# Patient Record
Sex: MALE | Race: WHITE | Employment: OTHER | URBAN - METROPOLITAN AREA
[De-identification: names, ages, dates, MRNs, and addresses within clinical notes are randomized per-mention and may not be internally consistent; named-entity substitution may affect disease eponyms.]

---

## 2018-06-01 ENCOUNTER — TRANSFERRED RECORDS (OUTPATIENT)
Dept: HEALTH INFORMATION MANAGEMENT | Facility: CLINIC | Age: 58
End: 2018-06-01

## 2018-07-10 ENCOUNTER — TRANSFERRED RECORDS (OUTPATIENT)
Dept: HEALTH INFORMATION MANAGEMENT | Facility: CLINIC | Age: 58
End: 2018-07-10

## 2019-01-23 LAB — PHQ9 SCORE: 10

## 2019-02-27 ENCOUNTER — TRANSFERRED RECORDS (OUTPATIENT)
Dept: HEALTH INFORMATION MANAGEMENT | Facility: CLINIC | Age: 59
End: 2019-02-27

## 2019-03-06 LAB — PHQ9 SCORE: 3

## 2019-03-22 ENCOUNTER — OFFICE VISIT (OUTPATIENT)
Dept: NEUROSURGERY | Facility: CLINIC | Age: 59
End: 2019-03-22
Payer: COMMERCIAL

## 2019-03-22 VITALS — WEIGHT: 201.2 LBS | DIASTOLIC BLOOD PRESSURE: 68 MMHG | TEMPERATURE: 98.2 F | SYSTOLIC BLOOD PRESSURE: 124 MMHG

## 2019-03-22 DIAGNOSIS — M48.02 CERVICAL STENOSIS OF SPINAL CANAL: Primary | ICD-10-CM

## 2019-03-22 PROCEDURE — 99204 OFFICE O/P NEW MOD 45 MIN: CPT | Performed by: PHYSICIAN ASSISTANT

## 2019-03-22 RX ORDER — MONTELUKAST SODIUM 10 MG/1
10 TABLET ORAL DAILY
COMMUNITY
Start: 2018-04-10

## 2019-03-22 RX ORDER — COVID-19 ANTIGEN TEST
KIT MISCELLANEOUS
Status: ON HOLD | COMMUNITY
End: 2019-05-15

## 2019-03-22 RX ORDER — FLUTICASONE PROPIONATE 50 MCG
1 SPRAY, SUSPENSION (ML) NASAL DAILY PRN
COMMUNITY
Start: 2018-04-10

## 2019-03-22 RX ORDER — SIMVASTATIN 40 MG
TABLET ORAL
Refills: 4 | Status: ON HOLD | COMMUNITY
Start: 2019-03-08 | End: 2019-08-28

## 2019-03-22 RX ORDER — DULOXETIN HYDROCHLORIDE 60 MG/1
60 CAPSULE, DELAYED RELEASE ORAL DAILY
Refills: 3 | COMMUNITY
Start: 2019-03-08

## 2019-03-22 RX ORDER — LOSARTAN POTASSIUM AND HYDROCHLOROTHIAZIDE 25; 100 MG/1; MG/1
1 TABLET ORAL DAILY
Refills: 3 | COMMUNITY
Start: 2019-03-08

## 2019-03-22 RX ORDER — ALBUTEROL SULFATE 90 UG/1
2 AEROSOL, METERED RESPIRATORY (INHALATION) EVERY 6 HOURS PRN
COMMUNITY
Start: 2018-04-10

## 2019-03-22 RX ORDER — AMLODIPINE BESYLATE 5 MG/1
5 TABLET ORAL DAILY
COMMUNITY
Start: 2018-04-10

## 2019-03-22 ASSESSMENT — PAIN SCALES - GENERAL: PAINLEVEL: SEVERE PAIN (7)

## 2019-03-22 NOTE — Clinical Note
3/22/2019         RE: Liborio Noguera  [address] [address] [place] [address]  [place] [address]        Dear Colleague,    Thank you for referring your patient, Liborio Noguera, to the [facility]. Please see a copy of my visit note below.    Dr. Reza Ya  [facility]  Neurosurgery Clinic Visit      CC: neck and back issues    Primary care Provider: [facility], Radiology    Referring Provider:  [facility]      Reason For Visit:   I was asked to consult on the patient for neck and back pain.      HPI: Liborio Noguera is a 58 year old male who presents for evaluation of his chief complaint of neck and back pain.  Regarding his low back, he describes gradually worsening low back pain, with pain that radiates into his legs bilaterally.  He has gotten to a point where he is unable to raise himself up by using his right leg.  He states that he was seen by an outside practice about a year ago, and it was recommended at that time that he have surgery.  Due to his work/life circumstances, he did not have time to get this done.  Regarding his cervical spine, he does note some chronic neck pain.  He also notes some bilateral hand numbness and tingling, right worse than left.  He denies any problems with fine motor control.  He does have some occasional pain into the arms bilaterally.  He has not had any recent treatment for this.  He did have a lumbar epidural injection in July 2018, which provided him with several days of symptomatic relief.  He denies bowel or bladder changes, or any problems with balance or coordination.      Past Medical History reviewed with patient during visit.    Past Surgical History reviewed with patient during visit.    Current Outpatient Medications   Medication     albuterol (VENTOLIN HFA) 108 (90 Base) MCG/ACT inhaler     amLODIPine (NORVASC) 5 MG tablet     fluticasone (FLONASE) 50 MCG/ACT nasal spray     fluticasone-salmeterol (ADVAIR  DISKUS) 250-50 MCG/DOSE inhaler     metFORMIN (GLUCOPHAGE) 500 MG tablet     montelukast (SINGULAIR) 10 MG tablet     sertraline (ZOLOFT) 50 MG tablet     DULoxetine (CYMBALTA) 60 MG capsule     losartan-hydrochlorothiazide (HYZAAR) 100-25 MG tablet     naproxen sodium 220 MG capsule     simvastatin (ZOCOR) 40 MG tablet     No current facility-administered medications for this visit.        Allergies   Allergen Reactions     Ace Inhibitors Cough       Social History     Socioeconomic History     Marital status:      Spouse name: None     Number of children: None     Years of education: None     Highest education level: None   Occupational History     None   Social Needs     Financial resource strain: None     Food insecurity:     Worry: None     Inability: None     Transportation needs:     Medical: None     Non-medical: None   Tobacco Use     Smoking status: Never Smoker     Smokeless tobacco: Never Used   Substance and Sexual Activity     Alcohol use: None     Drug use: None     Sexual activity: None   Lifestyle     Physical activity:     Days per week: None     Minutes per session: None     Stress: None   Relationships     Social connections:     Talks on phone: None     Gets together: None     Attends Religion service: None     Active member of club or organization: None     Attends meetings of clubs or organizations: None     Relationship status: None     Intimate partner violence:     Fear of current or ex partner: None     Emotionally abused: None     Physically abused: None     Forced sexual activity: None   Other Topics Concern     None   Social History Narrative     None       No family history on file.       ROS: 10 point ROS neg other than the symptoms noted above in the HPI.    Vital Signs: /68   Temp 98.2  F (36.8  C) (Temporal)   Wt 201 lb 3.2 oz (91.3 kg)     Examination:  Constitutional:  Alert, well nourished, NAD.  HEENT: Normocephalic, atraumatic.   Pulmonary:  Without shortness  of breath, normal effort.   Lymph: no lymphadenopathy to low back or LE.   Integumentary: Skin is free of rashes or lesions.   Cardiovascular:  No pitting edema of BLE.    Psych: Normal affect, no apparent distress    Neurological:  Awake  Alert  Oriented x 3  Speech clear  Cranial nerves II - XII grossly intact  Motor exam   Shoulder Abduction:  Right:  5/5   Left:  5/5  Biceps:                      Right:  5/5   Left:  5/5  Triceps:                     Right:  5/5   Left:  5/5  Wrist Extensors:       Right:  5/5   Left:  5/5  Wrist Flexors:           Right:  5/5   Left:  5/5  Intrinsics:                   Right:  5/5   Left:  5/5  Hip Flexor:                Right: 5/5  Left:  5/5  Hip Adductor:             Right:  5/5  Left:  5/5  Hip Abductor:             Right:  5/5  Left:  5/5  Gastroc Soleus:        Right:  5/5  Left:  5/5  Tib/Ant:                      Right:  5/5  Left:  5/5  EHL:                          Right:  5/5  Left:  5/5       Sensation normal to bilateral upper and lower extremities.    Reflexes are 2+ in the patellar and Achilles. There is no clonus. Downgoing Babinski.    Reflexes are 2+ in the brachial radialis and triceps. Negative Leah sign bilaterally.  Musculoskeletal:  Gait: Able to stand from a seated position.   Cervical examination reveals good range of motion.  No tenderness to palpation of the cervical spine or paraspinous muscles bilaterally.    Lumbar examination reveals no tenderness of the spine or paraspinous muscles.  Hip height is symmetrical. Negative SI joint, sciatic notch or greater trochanteric tenderness to palpation bilaterally.  Straight leg raise is negative bilaterally.      Imaging:   MRI of the cervical and lumbar spine was reviewed in the office today.  Cervical spine shows multiple levels of moderate stenosis, with multiple disc bulges and facet arthropathy.  Lumbar spine shows all severe central stenosis at L4-5, and a left paracentral disc herniation at  L5-S1.    Assessment/Plan:     Cervicalgia  Upper extremity radiculopathy  Low back pain  Lower extremity radiculopathy    Liborio Noguera is a 58 year old male.  I did have a discussion with the patient regarding his symptoms.  He has had years of worsening neck and back issues.  He understands the findings described in his MRI.  He is eager to resolve this problem, as he does have some time from work right now.  I will get him in for a consultation with physical therapy, and have him follow-up with Dr. Ya.  He is not interested in another injection, as it only provided him with a few days of relief.  He voiced agreement and understanding.          Nima Wheeler PA-C  [facility]  [address] [address]    Tel [phone]  Pager [phone]      Again, thank you for allowing me to participate in the care of your patient.        Sincerely,        Nima Wheeler PA-C

## 2019-03-22 NOTE — PROGRESS NOTES
Dr. Reza Ya  [facility]  Neurosurgery Clinic Visit      CC: neck and back issues    Primary care Provider: [facility], Radiology    Referring Provider:  [facility]      Reason For Visit:   I was asked to consult on the patient for neck and back pain.      HPI: Liborio Noguera is a 58 year old male who presents for evaluation of his chief complaint of neck and back pain.  Regarding his low back, he describes gradually worsening low back pain, with pain that radiates into his legs bilaterally.  He has gotten to a point where he is unable to raise himself up by using his right leg.  He states that he was seen by an outside practice about a year ago, and it was recommended at that time that he have surgery.  Due to his work/life circumstances, he did not have time to get this done.  Regarding his cervical spine, he does note some chronic neck pain.  He also notes some bilateral hand numbness and tingling, right worse than left.  He denies any problems with fine motor control.  He does have some occasional pain into the arms bilaterally.  He has not had any recent treatment for this.  He did have a lumbar epidural injection in July 2018, which provided him with several days of symptomatic relief.  He denies bowel or bladder changes, or any problems with balance or coordination.      Past Medical History reviewed with patient during visit.    Past Surgical History reviewed with patient during visit.    Current Outpatient Medications   Medication     albuterol (VENTOLIN HFA) 108 (90 Base) MCG/ACT inhaler     amLODIPine (NORVASC) 5 MG tablet     fluticasone (FLONASE) 50 MCG/ACT nasal spray     fluticasone-salmeterol (ADVAIR DISKUS) 250-50 MCG/DOSE inhaler     metFORMIN (GLUCOPHAGE) 500 MG tablet     montelukast (SINGULAIR) 10 MG tablet     sertraline (ZOLOFT) 50 MG tablet     DULoxetine (CYMBALTA) 60 MG capsule     losartan-hydrochlorothiazide (HYZAAR) 100-25 MG tablet      naproxen sodium 220 MG capsule     simvastatin (ZOCOR) 40 MG tablet     No current facility-administered medications for this visit.        Allergies   Allergen Reactions     Ace Inhibitors Cough       Social History     Socioeconomic History     Marital status:      Spouse name: None     Number of children: None     Years of education: None     Highest education level: None   Occupational History     None   Social Needs     Financial resource strain: None     Food insecurity:     Worry: None     Inability: None     Transportation needs:     Medical: None     Non-medical: None   Tobacco Use     Smoking status: Never Smoker     Smokeless tobacco: Never Used   Substance and Sexual Activity     Alcohol use: None     Drug use: None     Sexual activity: None   Lifestyle     Physical activity:     Days per week: None     Minutes per session: None     Stress: None   Relationships     Social connections:     Talks on phone: None     Gets together: None     Attends Baptism service: None     Active member of club or organization: None     Attends meetings of clubs or organizations: None     Relationship status: None     Intimate partner violence:     Fear of current or ex partner: None     Emotionally abused: None     Physically abused: None     Forced sexual activity: None   Other Topics Concern     None   Social History Narrative     None       No family history on file.       ROS: 10 point ROS neg other than the symptoms noted above in the HPI.    Vital Signs: /68   Temp 98.2  F (36.8  C) (Temporal)   Wt 201 lb 3.2 oz (91.3 kg)     Examination:  Constitutional:  Alert, well nourished, NAD.  HEENT: Normocephalic, atraumatic.   Pulmonary:  Without shortness of breath, normal effort.   Lymph: no lymphadenopathy to low back or LE.   Integumentary: Skin is free of rashes or lesions.   Cardiovascular:  No pitting edema of BLE.    Psych: Normal affect, no apparent  distress    Neurological:  Awake  Alert  Oriented x 3  Speech clear  Cranial nerves II - XII grossly intact  Motor exam   Shoulder Abduction:  Right:  5/5   Left:  5/5  Biceps:                      Right:  5/5   Left:  5/5  Triceps:                     Right:  5/5   Left:  5/5  Wrist Extensors:       Right:  5/5   Left:  5/5  Wrist Flexors:           Right:  5/5   Left:  5/5  Intrinsics:                   Right:  5/5   Left:  5/5  Hip Flexor:                Right: 5/5  Left:  5/5  Hip Adductor:             Right:  5/5  Left:  5/5  Hip Abductor:             Right:  5/5  Left:  5/5  Gastroc Soleus:        Right:  5/5  Left:  5/5  Tib/Ant:                      Right:  5/5  Left:  5/5  EHL:                          Right:  5/5  Left:  5/5       Sensation normal to bilateral upper and lower extremities.    Reflexes are 2+ in the patellar and Achilles. There is no clonus. Downgoing Babinski.    Reflexes are 2+ in the brachial radialis and triceps. Negative Leah sign bilaterally.  Musculoskeletal:  Gait: Able to stand from a seated position.   Cervical examination reveals good range of motion.  No tenderness to palpation of the cervical spine or paraspinous muscles bilaterally.    Lumbar examination reveals no tenderness of the spine or paraspinous muscles.  Hip height is symmetrical. Negative SI joint, sciatic notch or greater trochanteric tenderness to palpation bilaterally.  Straight leg raise is negative bilaterally.      Imaging:   MRI of the cervical and lumbar spine was reviewed in the office today.  Cervical spine shows multiple levels of moderate stenosis, with multiple disc bulges and facet arthropathy.  Lumbar spine shows all severe central stenosis at L4-5, and a left paracentral disc herniation at L5-S1.    Assessment/Plan:     Cervicalgia  Upper extremity radiculopathy  Low back pain  Lower extremity radiculopathy    Liborio Noguera is a 58 year old male.  I did have a discussion with the patient  regarding his symptoms.  He has had years of worsening neck and back issues.  He understands the findings described in his MRI.  He is eager to resolve this problem, as he does have some time from work right now.  I will get him in for a consultation with physical therapy, and have him follow-up with Dr. Ya.  He is not interested in another injection, as it only provided him with a few days of relief.  He voiced agreement and understanding.          Nima Wheeler PA-C  [facility]  [address] [address]    Tel [phone]  Pager [phone]

## 2019-04-02 ENCOUNTER — HOSPITAL ENCOUNTER (OUTPATIENT)
Dept: PHYSICAL THERAPY | Facility: OTHER | Age: 59
Setting detail: THERAPIES SERIES
End: 2019-04-02
Attending: PHYSICIAN ASSISTANT
Payer: COMMERCIAL

## 2019-04-02 DIAGNOSIS — M48.02 CERVICAL STENOSIS OF SPINAL CANAL: ICD-10-CM

## 2019-04-02 PROCEDURE — 97162 PT EVAL MOD COMPLEX 30 MIN: CPT | Mod: GP | Performed by: PHYSICAL THERAPIST

## 2019-04-02 PROCEDURE — 97012 MECHANICAL TRACTION THERAPY: CPT | Mod: GP | Performed by: PHYSICAL THERAPIST

## 2019-04-02 PROCEDURE — 97110 THERAPEUTIC EXERCISES: CPT | Mod: GP | Performed by: PHYSICAL THERAPIST

## 2019-04-02 PROCEDURE — 97010 HOT OR COLD PACKS THERAPY: CPT | Mod: GP | Performed by: PHYSICAL THERAPIST

## 2019-04-02 NOTE — PROGRESS NOTES
04/02/19 1332   General Information   Type of Visit Initial OP Ortho PT Evaluation   Start of Care Date 04/02/19   Referring Physician gage WALKER   Patient/Family Goals Statement low back and neck pain    Orders Evaluate and Treat   Date of Order 03/22/19   [facility]   Medical Diagnosis cervical stenosis of spinal canal M48.02   Surgical/Medical history reviewed Yes   Precautions/Limitations no known precautions/limitations   Body Part(s)   Body Part(s) Lumbar Spine/SI;Cervical Spine   Presentation and Etiology   Pertinent history of current problem (include personal factors and/or comorbidities that impact the POC) patient reports he has had B low back pain x 1 year right side worse and has pain and numb and tingling down laterial leg to foot and all toes are numb is present 50 % of the day . denies incottenence . has had neck pain x 1 year and more on right compared to left and will have tingling in right 3,4,5 finger 50% of the day and will have pain that comes from neck to elbow . and when he lays prone  or sidelying B UE will go numb. PMH none reported . work is [email or web address] for [employer] . PMH had injection to low back july 10 2018   Impairments A. Pain;K. Numbness;L. Tingling   Functional Limitations perform activities of daily living;perform required work activities;perform desired leisure / sports activities   Symptom Location neck and low back    Onset date of current episode/exacerbation 04/02/18   Chronicity Chronic   Pain rating (0-10 point scale) Best (/10);Worst (/10)   Best (/10) back 5/10 neck 5/10   Worst (/10) back 8/10 neck 7/10   Pain quality A. Sharp;B. Dull;C. Aching   Frequency of pain/symptoms A. Constant   Pain/symptoms are: Worse in the morning   Pain/symptoms exacerbated by B. Walking;G. Certain positions  (stairs )   Pain/symptoms eased by C. Rest;E. Changing positions;F. Certain positions   Progression of symptoms since onset: Worsened   Current / Previous  Interventions   Diagnostic Tests: X-ray;MRI   Prior Level of Function   Functional Level Prior Comment unable to do stairs stepping up with right LE    Current Level of Function   Current Community Support Family/friend caregiver   Patient role/employment history E. Unemployed   Fall Risk Screen   Fall screen completed by PT   Have you fallen 2 or more times in the past year? Yes   Have you fallen and had an injury in the past year? No   Timed Up and Go score (seconds) less than 13 seconds    Is patient a fall risk? No   Fall screen comments slipped on ice this winter and rough ground , right LE is not as sturdy    Lumbar Spine/SI Objective Findings   Flexion ROM full pain in low back    Extension ROM limited and more pain in low back    Right Side Bending ROM limited and more pain in low back    Left Side Bending ROM no change    Hip Screen neg   Hamstring Flexibility B 15   SLR pain in right low back    Patellar Tendon Reflexes  equal    Achilles Tendon Reflexes equal    Neurological Testing Comments tingling numb in right distal calf and toes    Cervical Spine   Cervical Flexion ROM 38   Cervical Extension ROM 44 pain in right shoulder    Cervical Right Side Bending ROM 38   Cervical Left Side Bending ROM 35   Cervical Right Rotation ROM 65   Cervical Left Rotation ROM 65   Cervical/Thoracic/Shoulder ROM Comments B UE WNL AROM and good strength    Spurling Test postive on right pain in shoulder no change in tingling    Dermatome/Sensory Testing right hand and fingers    Biceps Reflexes B equal    Brachioradialis Reflexes B equal    Triceps Reflexes B equal    Planned Therapy Interventions   Planned Therapy Interventions ADL retraining;balance training;ROM;strengthening;stretching   Planned Modality Interventions   Planned Modality Interventions Comments modalities as needed and trial of hot pack with traction for neck and low back    Clinical Impression   Criteria for Skilled Therapeutic Interventions Met yes,  treatment indicated   PT Diagnosis low back pain with weak and tingling in right LE and neck pain with right UE tingling    Functional limitations due to impairments cesar med SALOMÓN 33.33 NDI 12    Clinical Presentation Stable/Uncomplicated   Clinical Presentation Rationale patient seen due to c/o neck and low back pain with tingling in right UE and LE and weak inright LE , patient has had progressive increase in pain and tingling over the last 1 year, has been forced to quit work due to pain and weakness  Rx is exercises in clinic and instruction in HEP with trial of traction for neck and low back    Clinical Decision Making (Complexity) Low complexity   Predicted Duration of Therapy Intervention (days/wks) 2-3x week x 1 month , 1-2x week x 1 month    Risk & Benefits of therapy have been explained Yes   Patient, Family & other staff in agreement with plan of care Yes   Education Assessment   Preferred Learning Style Listening;Reading;Demonstration   Barriers to Learning No barriers   ORTHO GOALS   PT Ortho Eval Goals 1;2;3   Ortho Goal 1   Goal Identifier 1   Goal Description instruction in HEP and compliant with it 5 of 7 days    Target Date 06/02/19   Ortho Goal 2   Goal Identifier 2   Goal Description patient to have 50 % reduction in tingling in right UE and LE    Target Date 06/02/19   Ortho Goal 3   Goal Identifier 3   Goal Description patient to have reduction in pain level currently 5-8/10 goal is 3-6/10    Target Date 06/02/19   Total Evaluation Time   PT Hilary Moderate Complexity Minutes (98542) 30   Therapy Certification   Certification date from 04/02/19   Certification date to 06/02/19   Medical Diagnosis cervical stenosis of spinal canal M48.02, Cervicalgia,Upper extremity radiculopathy, , low back pain . lumbar radiculopathy

## 2019-04-02 NOTE — PROGRESS NOTES
[facility]          OUTPATIENT PHYSICAL THERAPY ORTHOPEDIC EVALUATION  PLAN OF TREATMENT FOR OUTPATIENT REHABILITATION  (COMPLETE FOR INITIAL CLAIMS ONLY)  Patient's Last Name, First Name, M.I.  YOB: 1960  Liborio Noguera    Provider s Name:  [facility]   Medical Record No.  [id number]   Start of Care Date:  04/02/19   Onset Date:  04/02/18   Type:     _X__PT   ___OT   ___SLP Medical Diagnosis:  cervical stenosis of spinal canal M48.02, Cervicalgia,Upper extremity radiculopathy, , low back pain . lumbar radiculopathy      PT Diagnosis:  low back pain with weak and tingling in right LE and neck pain with right UE tingling    Visits from SOC:  1      _________________________________________________________________________________  Plan of Treatment/Functional Goals:  ADL retraining, balance training, ROM, strengthening, stretching        modalities as needed and trial of hot pack with traction for neck and low back   Goals  Goal Identifier: 1  Goal Description: instruction in HEP and compliant with it 5 of 7 days   Target Date: 06/02/19    Goal Identifier: 2  Goal Description: patient to have 50 % reduction in tingling in right UE and LE   Target Date: 06/02/19    Goal Identifier: 3  Goal Description: patient to have reduction in pain level currently 5-8/10 goal is 3-6/10   Target Date: 06/02/19                                                           Therapy Frequency:     Predicted Duration of Therapy Intervention:  2-3x week x 1 month , 1-2x week x 1 month     Kenyatta Dobson, PT                 I CERTIFY THE NEED FOR THESE SERVICES FURNISHED UNDER        THIS PLAN OF TREATMENT AND WHILE UNDER MY CARE     (Physician co-signature of this document indicates review and certification of the therapy plan).                       Certification Date From:  04/02/19   Certification Date To:  06/02/19    Referring Provider:  gage Galdamez  Assessment        See Epic Evaluation Start of Care Date: 04/02/19

## 2019-04-03 ENCOUNTER — HOSPITAL ENCOUNTER (OUTPATIENT)
Dept: PHYSICAL THERAPY | Facility: OTHER | Age: 59
Setting detail: THERAPIES SERIES
End: 2019-04-03
Attending: PHYSICIAN ASSISTANT
Payer: COMMERCIAL

## 2019-04-03 ENCOUNTER — TRANSFERRED RECORDS (OUTPATIENT)
Dept: HEALTH INFORMATION MANAGEMENT | Facility: CLINIC | Age: 59
End: 2019-04-03

## 2019-04-03 LAB
CHOLEST SERPL-MCNC: 183 MG/DL
CREAT SERPL-MCNC: 1.18 MG/DL (ref 0.66–1.25)
GFR SERPL CREATININE-BSD FRML MDRD: >60 ML/MIN/1.73M2
GLUCOSE SERPL-MCNC: 122 MG/DL (ref 75–100)
HBA1C MFR BLD: 5.4 % (ref 4.8–6)
HDLC SERPL-MCNC: 107 MG/DL (ref 40–60)
LDLC SERPL CALC-MCNC: 65 MG/DL
NONHDLC SERPL-MCNC: 76 MG/DL
POTASSIUM SERPL-SCNC: 4 MMOL/L (ref 3.5–5.1)
TRIGL SERPL-MCNC: 54 MG/DL

## 2019-04-03 PROCEDURE — 97010 HOT OR COLD PACKS THERAPY: CPT | Mod: GP | Performed by: PHYSICAL THERAPIST

## 2019-04-03 PROCEDURE — 97012 MECHANICAL TRACTION THERAPY: CPT | Mod: GP | Performed by: PHYSICAL THERAPIST

## 2019-04-08 ENCOUNTER — HOSPITAL ENCOUNTER (OUTPATIENT)
Dept: PHYSICAL THERAPY | Facility: OTHER | Age: 59
Setting detail: THERAPIES SERIES
End: 2019-04-08
Attending: PHYSICIAN ASSISTANT
Payer: COMMERCIAL

## 2019-04-08 PROCEDURE — 97012 MECHANICAL TRACTION THERAPY: CPT | Mod: GP | Performed by: PHYSICAL THERAPIST

## 2019-04-08 PROCEDURE — 97010 HOT OR COLD PACKS THERAPY: CPT | Mod: GP | Performed by: PHYSICAL THERAPIST

## 2019-04-10 ENCOUNTER — HOSPITAL ENCOUNTER (OUTPATIENT)
Dept: PHYSICAL THERAPY | Facility: OTHER | Age: 59
Setting detail: THERAPIES SERIES
End: 2019-04-10
Attending: PHYSICIAN ASSISTANT
Payer: COMMERCIAL

## 2019-04-10 PROCEDURE — 97010 HOT OR COLD PACKS THERAPY: CPT | Mod: GP | Performed by: PHYSICAL THERAPIST

## 2019-04-10 PROCEDURE — 97012 MECHANICAL TRACTION THERAPY: CPT | Mod: GP | Performed by: PHYSICAL THERAPIST

## 2019-04-11 ENCOUNTER — TRANSFERRED RECORDS (OUTPATIENT)
Dept: HEALTH INFORMATION MANAGEMENT | Facility: CLINIC | Age: 59
End: 2019-04-11

## 2019-04-15 ENCOUNTER — HOSPITAL ENCOUNTER (OUTPATIENT)
Dept: PHYSICAL THERAPY | Facility: OTHER | Age: 59
Setting detail: THERAPIES SERIES
End: 2019-04-15
Attending: PHYSICIAN ASSISTANT
Payer: COMMERCIAL

## 2019-04-15 PROCEDURE — 97012 MECHANICAL TRACTION THERAPY: CPT | Mod: GP | Performed by: PHYSICAL THERAPIST

## 2019-04-15 PROCEDURE — 97010 HOT OR COLD PACKS THERAPY: CPT | Mod: GP | Performed by: PHYSICAL THERAPIST

## 2019-04-17 ENCOUNTER — HOSPITAL ENCOUNTER (OUTPATIENT)
Dept: PHYSICAL THERAPY | Facility: OTHER | Age: 59
Setting detail: THERAPIES SERIES
End: 2019-04-17
Attending: PHYSICIAN ASSISTANT
Payer: COMMERCIAL

## 2019-04-17 PROCEDURE — 97010 HOT OR COLD PACKS THERAPY: CPT | Mod: GP | Performed by: PHYSICAL THERAPIST

## 2019-04-17 PROCEDURE — 97012 MECHANICAL TRACTION THERAPY: CPT | Mod: GP | Performed by: PHYSICAL THERAPIST

## 2019-04-18 ENCOUNTER — OFFICE VISIT (OUTPATIENT)
Dept: NEUROSURGERY | Facility: CLINIC | Age: 59
End: 2019-04-18
Payer: COMMERCIAL

## 2019-04-18 VITALS — SYSTOLIC BLOOD PRESSURE: 115 MMHG | HEART RATE: 110 BPM | DIASTOLIC BLOOD PRESSURE: 73 MMHG | WEIGHT: 201 LBS

## 2019-04-18 DIAGNOSIS — M48.062 LUMBAR STENOSIS WITH NEUROGENIC CLAUDICATION: Primary | ICD-10-CM

## 2019-04-18 PROCEDURE — 99215 OFFICE O/P EST HI 40 MIN: CPT | Performed by: NEUROLOGICAL SURGERY

## 2019-04-18 ASSESSMENT — PAIN SCALES - GENERAL: PAINLEVEL: SEVERE PAIN (7)

## 2019-04-18 NOTE — NURSING NOTE
Liborio Noguera is a 58 year old male who presents for:  Chief Complaint   Patient presents with     Neurologic Problem     surgery consult neck and back pain         Initial Vitals:  /73 (BP Location: Left arm, Patient Position: Chair, Cuff Size: Adult Large)   Pulse 110   Wt 91.2 kg (201 lb)  There is no height or weight on file to calculate BMI.. There is no height or weight on file to calculate BSA. BP completed using cuff size: large  Severe Pain (7)        Nursing Comments:         Aida Fong

## 2019-04-18 NOTE — PATIENT INSTRUCTIONS
Surgery scheduled at [facility] for L4-5 lami/discectomy  (a same-day surgery with possible overnight stay)    Pre-Operative:  -Surgical risks: blood clots in the leg or lung, problems urinating, nerve damage, drainage from the incision, infection, stiffness  - Pre-operative physical with primary care physician within 30 days of surgical date.   -Stop all foods and liquids 8 hours prior to surgery.  -Shower procedure: please shower with antibacterial soap the night before surgery and morning of surgery. Refer to information sheet in folder.   - Discontinue Aspirin, NSAIDs (Advil, Ibuprofen, Naproxen, Nuprin, Diclofenac, Meloxicam, Aleve, Celebrex) x 7 days prior to surgical date. After surgery, do not begin taking these medications until given clearance.    - May take Tylenol for pain.    Post-Operative:  -Hospital stay: likely same day procedure with discharge home day of surgery, may stay for 23 hour observation hospitalization for monitoring.  - Post operative pain  will require pain medications and muscle relaxants. You will receive medication upon discharge.  -Do NOT drive while taking narcotic pain medication.  -Post operative incision care-    -Watch for signs of infection: redness, swelling, warmth, drainage, and fever of 101 degrees or higher. Notify clinic [phone].   -Keep incision clean and dry. You may shower. No submerging incision in water such as pools, hot tubs, baths for at least 8 weeks or until incision is healed.   - Post operative activity limitations for 4-6 weeks after surgery: no lifting > 10 pounds, limited bending, twisting, or overhead reaching. You will be re-evaluated at your follow up appointments.   -If you are currently employed, you will need to be off work for recovery and healing. Please fax any FMLA/short term disability paperwork to [phone]. You may call our clinic when you'd like to return to work and we can provide a work letter.   - Follow up  appointments: If you have staples, you will need to follow up 2 weeks after surgery with nurse. 6 week post op and 3 months post op. Please call to schedule follow up appointment at [phone].  -Surgery folder provided to patient.    Raisa DALEY RN ([facility] Nurse)  [facility]  [place] [address]  [place] [address]  T:  [phone]  F:  [phone]

## 2019-04-18 NOTE — LETTER
4/18/2019         RE: Liborio Noguera  [id number] [address] [place] [address]  [place] [address]        Dear Colleague,    Thank you for referring your patient, Liborio Noguera, to the [facility]. Please see a copy of my visit note below.       Patient Education    Education included but not limited to:  - Surgical risks: blood clots, urinating difficulties, nerve damage, infection.  - Pre-operative physical with primary care physician within 30 days of surgical date.   - Pre-operative clearance from other pertaining specialties.   - Discontinue NSAIDS x 7 days prior to surgical date.   - Do not begin taking NSAIDs (Advil, Motrin, Ibuprofen, Nuprin, Diclofenac, Meloxicam, Aleve, Celebrex, Aspirin, etc.) until 6 weeks after surgery if you had a fusion. May cause bleeding and interfere with bone healing.    -May try Tylenol for pain.  -Discussed being off work after surgery, short term disability, FMLA, etc.   -Forms to be completed    -Pre-op timeline: NPO, shower, medications    -Hospital stay: Checking in, surgery, recovery room, hospital room.    - Post operative pain management: narcotics, muscle relaxants, ice, etc.   -No driving while taking narcotics     -Post operative incision care:   Keep your incision clean and dry.   Okay to shower. No submerging in water until incision healed.   Watch for signs of infection and notify clinic if drainage or fever develops.   - Post operative activity limitations recommended until follow up appointment: no lifting > 10 pounds; limited bending, twisting, overhead reaching.  -If a brace is required per Dr. Ya, [facility] will fit you for the brace in the hospital.  - Follow up appointments: 6 week post op, 3 months post op. You will need to an xray before each appointment. Please call to schedule follow up appointment at [phone].   -  Education book was also given to the patient for further review.      Patient verbalized understanding of above instructions. All  questions were answered to the best of my ability and the patient's satisfaction. Patient advised to call with any additional questions or concerns.    Raisa Ray RN ([facility] Nurse)  [facility]  [address] [address]  T:  [phone]  F:  [phone]      Liborio Noguera is a 58 year old male who presents for evaluation of his chief complaint of neck and back pain.  Regarding his low back, he describes gradually worsening low back pain, with pain that radiates into his legs bilaterally.  He has gotten to a point where he is unable to raise himself up by using his right leg.  He states that he was seen by an outside practice about a year ago, and it was recommended at that time that he have surgery.  Due to his work/life circumstances, he did not have time to get this done.  Regarding his cervical spine, he does note some chronic neck pain.  He also notes some bilateral hand numbness and tingling, right worse than left.  He denies any problems with fine motor control.  He does have some occasional pain into the arms bilaterally.  He has not had any recent treatment for this.  He did have a lumbar epidural injection in July 2018, which provided him with several days of symptomatic relief.  He denies bowel or bladder changes, or any problems with balance or coordination.    In the last 2 months, having worst symptoms of back and right leg pain, with pain/paresthesias to right dorsal foot, and heaviness of the leg with inability to lift.  PT/ISMAEL without improvement.  MRI with L4-5 severe stenosis and right paracentral disc herniation.      Past Medical History reviewed with patient during visit.    Past Surgical History reviewed with patient during visit.    Current Outpatient Medications   Medication     albuterol (VENTOLIN HFA) 108 (90 Base) MCG/ACT inhaler     amLODIPine (NORVASC) 5 MG tablet     DULoxetine (CYMBALTA) 60 MG capsule      fluticasone (FLONASE) 50 MCG/ACT nasal spray     fluticasone-salmeterol (ADVAIR DISKUS) 250-50 MCG/DOSE inhaler     losartan-hydrochlorothiazide (HYZAAR) 100-25 MG tablet     metFORMIN (GLUCOPHAGE) 500 MG tablet     montelukast (SINGULAIR) 10 MG tablet     naproxen sodium 220 MG capsule     sertraline (ZOLOFT) 50 MG tablet     simvastatin (ZOCOR) 40 MG tablet     No current facility-administered medications for this visit.        Allergies   Allergen Reactions     Ace Inhibitors Cough       Social History     Socioeconomic History     Marital status:      Spouse name: None     Number of children: None     Years of education: None     Highest education level: None   Occupational History     None   Social Needs     Financial resource strain: None     Food insecurity:     Worry: None     Inability: None     Transportation needs:     Medical: None     Non-medical: None   Tobacco Use     Smoking status: Never Smoker     Smokeless tobacco: Never Used   Substance and Sexual Activity     Alcohol use: None     Drug use: None     Sexual activity: None   Lifestyle     Physical activity:     Days per week: None     Minutes per session: None     Stress: None   Relationships     Social connections:     Talks on phone: None     Gets together: None     Attends Buddhism service: None     Active member of club or organization: None     Attends meetings of clubs or organizations: None     Relationship status: None     Intimate partner violence:     Fear of current or ex partner: None     Emotionally abused: None     Physically abused: None     Forced sexual activity: None   Other Topics Concern     None   Social History Narrative     None       History reviewed. No pertinent family history.       ROS: 10 point ROS neg other than the symptoms noted above in the HPI.    Vital Signs: /73 (BP Location: Left arm, Patient Position: Chair, Cuff Size: Adult Large)   Pulse 110   Wt 91.2 kg (201 lb)      Examination:  Constitutional:  Alert, well nourished, NAD.  HEENT: Normocephalic, atraumatic.   Pulmonary:  Without shortness of breath, normal effort.   Lymph: no lymphadenopathy to low back or LE.   Integumentary: Skin is free of rashes or lesions.   Cardiovascular:  No pitting edema of BLE.    Psych: Normal affect, no apparent distress    Neurological:  Awake  Alert  Oriented x 3  Speech clear  Cranial nerves II - XII grossly intact  Motor exam   Shoulder Abduction:  Right:  5/5   Left:  5/5  Biceps:                      Right:  5/5   Left:  5/5  Triceps:                     Right:  5/5   Left:  5/5  Wrist Extensors:       Right:  5/5   Left:  5/5  Wrist Flexors:           Right:  5/5   Left:  5/5  Intrinsics:                   Right:  5/5   Left:  5/5  Hip Flexor:                Right: 5/5  Left:  5/5  Hip Adductor:             Right:  5/5  Left:  5/5  Hip Abductor:             Right:  5/5  Left:  5/5  Gastroc Soleus:        Right:  5/5  Left:  5/5  Tib/Ant:                      Right:  5/5  Left:  5/5  EHL:                          Right:  5/5  Left:  5/5       Sensation normal to bilateral upper and lower extremities.    Reflexes are 2+ in the patellar and Achilles. There is no clonus. Downgoing Babinski.    Reflexes are 2+ in the brachial radialis and triceps. Negative Leah sign bilaterally.  Musculoskeletal:  Gait: Able to stand from a seated position.   Cervical examination reveals good range of motion.  No tenderness to palpation of the cervical spine or paraspinous muscles bilaterally.    Lumbar examination reveals no tenderness of the spine or paraspinous muscles.  Hip height is symmetrical. Negative SI joint, sciatic notch or greater trochanteric tenderness to palpation bilaterally.  Straight leg raise is negative bilaterally.      Imaging:   MRI of the cervical and lumbar spine was reviewed in the office today.  Cervical spine shows multiple levels of moderate stenosis, with multiple disc  bulges and facet arthropathy.  Lumbar spine shows all severe central stenosis at L4-5, and a left paracentral disc herniation at L5-S1.    Assessment/Plan:     Cervicalgia  Upper extremity radiculopathy  Low back pain  Lower extremity radiculopathy    Will plan for L4-5 lami/discectomy  Risks and benefits discussed      Again, thank you for allowing me to participate in the care of your patient.        Sincerely,        Reza Ya MD

## 2019-04-18 NOTE — PROGRESS NOTES
Liborio Noguera is a 58 year old male who presents for evaluation of his chief complaint of neck and back pain.  Regarding his low back, he describes gradually worsening low back pain, with pain that radiates into his legs bilaterally.  He has gotten to a point where he is unable to raise himself up by using his right leg.  He states that he was seen by an outside practice about a year ago, and it was recommended at that time that he have surgery.  Due to his work/life circumstances, he did not have time to get this done.  Regarding his cervical spine, he does note some chronic neck pain.  He also notes some bilateral hand numbness and tingling, right worse than left.  He denies any problems with fine motor control.  He does have some occasional pain into the arms bilaterally.  He has not had any recent treatment for this.  He did have a lumbar epidural injection in July 2018, which provided him with several days of symptomatic relief.  He denies bowel or bladder changes, or any problems with balance or coordination.    In the last 2 months, having worst symptoms of back and right leg pain, with pain/paresthesias to right dorsal foot, and heaviness of the leg with inability to lift.  PT/ISMAEL without improvement.  MRI with L4-5 severe stenosis and right paracentral disc herniation.      Past Medical History reviewed with patient during visit.    Past Surgical History reviewed with patient during visit.    Current Outpatient Medications   Medication     albuterol (VENTOLIN HFA) 108 (90 Base) MCG/ACT inhaler     amLODIPine (NORVASC) 5 MG tablet     DULoxetine (CYMBALTA) 60 MG capsule     fluticasone (FLONASE) 50 MCG/ACT nasal spray     fluticasone-salmeterol (ADVAIR DISKUS) 250-50 MCG/DOSE inhaler     losartan-hydrochlorothiazide (HYZAAR) 100-25 MG tablet     metFORMIN (GLUCOPHAGE) 500 MG tablet     montelukast (SINGULAIR) 10 MG tablet     naproxen sodium 220 MG capsule     sertraline (ZOLOFT) 50 MG tablet      simvastatin (ZOCOR) 40 MG tablet     No current facility-administered medications for this visit.        Allergies   Allergen Reactions     Ace Inhibitors Cough       Social History     Socioeconomic History     Marital status:      Spouse name: None     Number of children: None     Years of education: None     Highest education level: None   Occupational History     None   Social Needs     Financial resource strain: None     Food insecurity:     Worry: None     Inability: None     Transportation needs:     Medical: None     Non-medical: None   Tobacco Use     Smoking status: Never Smoker     Smokeless tobacco: Never Used   Substance and Sexual Activity     Alcohol use: None     Drug use: None     Sexual activity: None   Lifestyle     Physical activity:     Days per week: None     Minutes per session: None     Stress: None   Relationships     Social connections:     Talks on phone: None     Gets together: None     Attends Scientology service: None     Active member of club or organization: None     Attends meetings of clubs or organizations: None     Relationship status: None     Intimate partner violence:     Fear of current or ex partner: None     Emotionally abused: None     Physically abused: None     Forced sexual activity: None   Other Topics Concern     None   Social History Narrative     None       History reviewed. No pertinent family history.       ROS: 10 point ROS neg other than the symptoms noted above in the HPI.    Vital Signs: /73 (BP Location: Left arm, Patient Position: Chair, Cuff Size: Adult Large)   Pulse 110   Wt 91.2 kg (201 lb)     Examination:  Constitutional:  Alert, well nourished, NAD.  HEENT: Normocephalic, atraumatic.   Pulmonary:  Without shortness of breath, normal effort.   Lymph: no lymphadenopathy to low back or LE.   Integumentary: Skin is free of rashes or lesions.   Cardiovascular:  No pitting edema of BLE.    Psych: Normal affect, no apparent  distress    Neurological:  Awake  Alert  Oriented x 3  Speech clear  Cranial nerves II - XII grossly intact  Motor exam   Shoulder Abduction:  Right:  5/5   Left:  5/5  Biceps:                      Right:  5/5   Left:  5/5  Triceps:                     Right:  5/5   Left:  5/5  Wrist Extensors:       Right:  5/5   Left:  5/5  Wrist Flexors:           Right:  5/5   Left:  5/5  Intrinsics:                   Right:  5/5   Left:  5/5  Hip Flexor:                Right: 5/5  Left:  5/5  Hip Adductor:             Right:  5/5  Left:  5/5  Hip Abductor:             Right:  5/5  Left:  5/5  Gastroc Soleus:        Right:  5/5  Left:  5/5  Tib/Ant:                      Right:  5/5  Left:  5/5  EHL:                          Right:  5/5  Left:  5/5       Sensation normal to bilateral upper and lower extremities.    Reflexes are 2+ in the patellar and Achilles. There is no clonus. Downgoing Babinski.    Reflexes are 2+ in the brachial radialis and triceps. Negative Leah sign bilaterally.  Musculoskeletal:  Gait: Able to stand from a seated position.   Cervical examination reveals good range of motion.  No tenderness to palpation of the cervical spine or paraspinous muscles bilaterally.    Lumbar examination reveals no tenderness of the spine or paraspinous muscles.  Hip height is symmetrical. Negative SI joint, sciatic notch or greater trochanteric tenderness to palpation bilaterally.  Straight leg raise is negative bilaterally.      Imaging:   MRI of the cervical and lumbar spine was reviewed in the office today.  Cervical spine shows multiple levels of moderate stenosis, with multiple disc bulges and facet arthropathy.  Lumbar spine shows all severe central stenosis at L4-5, and a left paracentral disc herniation at L5-S1.    Assessment/Plan:     Cervicalgia  Upper extremity radiculopathy  Low back pain  Lower extremity radiculopathy    Will plan for L4-5 lami/discectomy  Risks and benefits discussed

## 2019-04-18 NOTE — PROGRESS NOTES
Patient Education    Education included but not limited to:  - Surgical risks: blood clots, urinating difficulties, nerve damage, infection.  - Pre-operative physical with primary care physician within 30 days of surgical date.   - Pre-operative clearance from other pertaining specialties.   - Discontinue NSAIDS x 7 days prior to surgical date.   - Do not begin taking NSAIDs (Advil, Motrin, Ibuprofen, Nuprin, Diclofenac, Meloxicam, Aleve, Celebrex, Aspirin, etc.) until 6 weeks after surgery if you had a fusion. May cause bleeding and interfere with bone healing.    -May try Tylenol for pain.  -Discussed being off work after surgery, short term disability, FMLA, etc.   -Forms to be completed    -Pre-op timeline: NPO, shower, medications    -Hospital stay: Checking in, surgery, recovery room, hospital room.    - Post operative pain management: narcotics, muscle relaxants, ice, etc.   -No driving while taking narcotics     -Post operative incision care:   Keep your incision clean and dry.   Okay to shower. No submerging in water until incision healed.   Watch for signs of infection and notify clinic if drainage or fever develops.   - Post operative activity limitations recommended until follow up appointment: no lifting > 10 pounds; limited bending, twisting, overhead reaching.  -If a brace is required per Dr. Ya, [facility] will fit you for the brace in the hospital.  - Follow up appointments: 6 week post op, 3 months post op. You will need to an xray before each appointment. Please call to schedule follow up appointment at [phone].   -  Education book was also given to the patient for further review.      Patient verbalized understanding of above instructions. All questions were answered to the best of my ability and the patient's satisfaction. Patient advised to call with any additional questions or concerns.    Raisa Ray RN ([facility] Nurse)  [facility]  [facility]  [address] [address]  [place] [address]  T:  [phone]  F:  [phone]

## 2019-04-22 ENCOUNTER — HOSPITAL ENCOUNTER (OUTPATIENT)
Dept: PHYSICAL THERAPY | Facility: OTHER | Age: 59
Setting detail: THERAPIES SERIES
End: 2019-04-22
Attending: PHYSICIAN ASSISTANT
Payer: COMMERCIAL

## 2019-04-22 PROCEDURE — 97012 MECHANICAL TRACTION THERAPY: CPT | Mod: GP | Performed by: PHYSICAL THERAPIST

## 2019-04-22 PROCEDURE — 97010 HOT OR COLD PACKS THERAPY: CPT | Mod: GP | Performed by: PHYSICAL THERAPIST

## 2019-04-23 ENCOUNTER — TELEPHONE (OUTPATIENT)
Dept: NEUROSURGERY | Facility: CLINIC | Age: 59
End: 2019-04-23

## 2019-04-23 NOTE — TELEPHONE ENCOUNTER
Type of surgery: L4-5 Laminectomy/Discectomy  Location of surgery: [facility]  Date and time of surgery: 05/15/2019 at 10:30am  Surgeon: NICHELLE Ya  Pre-Op Appt Date: Discussed  Post-Op Appt Date: 06/28/2019  Packet sent out: Yes  Pre-cert/Authorization completed:  Yes  Date: 04/23/2019 KARL

## 2019-04-24 ENCOUNTER — HOSPITAL ENCOUNTER (OUTPATIENT)
Dept: PHYSICAL THERAPY | Facility: OTHER | Age: 59
Setting detail: THERAPIES SERIES
End: 2019-04-24
Attending: PHYSICIAN ASSISTANT
Payer: COMMERCIAL

## 2019-04-24 PROCEDURE — 97010 HOT OR COLD PACKS THERAPY: CPT | Mod: GP | Performed by: PHYSICAL THERAPIST

## 2019-04-24 PROCEDURE — 97012 MECHANICAL TRACTION THERAPY: CPT | Mod: GP | Performed by: PHYSICAL THERAPIST

## 2019-04-29 ENCOUNTER — HOSPITAL ENCOUNTER (OUTPATIENT)
Dept: PHYSICAL THERAPY | Facility: OTHER | Age: 59
Setting detail: THERAPIES SERIES
End: 2019-04-29
Attending: PHYSICIAN ASSISTANT
Payer: COMMERCIAL

## 2019-04-29 PROCEDURE — 97010 HOT OR COLD PACKS THERAPY: CPT | Mod: GP | Performed by: PHYSICAL THERAPIST

## 2019-04-29 PROCEDURE — 97012 MECHANICAL TRACTION THERAPY: CPT | Mod: GP | Performed by: PHYSICAL THERAPIST

## 2019-05-01 ENCOUNTER — HOSPITAL ENCOUNTER (OUTPATIENT)
Dept: PHYSICAL THERAPY | Facility: OTHER | Age: 59
Setting detail: THERAPIES SERIES
End: 2019-05-01
Attending: PHYSICIAN ASSISTANT
Payer: COMMERCIAL

## 2019-05-01 PROCEDURE — 97010 HOT OR COLD PACKS THERAPY: CPT | Mod: GP | Performed by: PHYSICAL THERAPIST

## 2019-05-01 PROCEDURE — 97012 MECHANICAL TRACTION THERAPY: CPT | Mod: GP | Performed by: PHYSICAL THERAPIST

## 2019-05-08 NOTE — ADDENDUM NOTE
Encounter addended by: Kenyatta Dobson, PT on: 5/8/2019 7:46 AM   Actions taken: Episode resolved, Sign clinical note

## 2019-05-08 NOTE — PROGRESS NOTES
Outpatient Physical Therapy Discharge Note     Patient: Liborio Noguera  : 1960    Beginning/End Dates of Reporting Period:  2019 to 2019    Referring Provider: gage gregory [facility]     Therapy Diagnosis: neck and back pain      Client Self Report:      Objective Measurements:  Objective Measure: pain low back 5-8/10 neck 5-7/10 cesar med SALOMÓN 33.33 NDI 12 at eval   Details: no change in cesar , SALOMÓN , NDI , pain LOW back 5-8/10 neck pain 5-6/10     Patient seen for 10 Rx sessions for use of traction to his low back and for his neck   For the low back we had no significant or long lasting relief of pain   For the neck he did have significant relief of pain with traction   He was instructed in HEP chin tucks 10x 4x day , supine or sitting knee to chest 10x 2-3 x day / or sit and floss hold 10 x 3 daily, will hold the low back exercises for now until told by his doctor to continue or do different therapy after surgery        Goals:  Goal Identifier 1   Goal Description instruction in HEP and compliant with it 5 of 7 days    Target Date 19   Date Met      Progress:     Goal Identifier 2   Goal Description patient to have 50 % reduction in tingling in right UE and LE    Target Date 19   Date Met      Progress:     Goal Identifier 3   Goal Description patient to have reduction in pain level currently 5-8/10 goal is 3-6/10    Target Date 19   Date Met      Progress:       Progress Toward Goals:   Progress this reporting period: patient was compliant with HEP and found relief for his neck but for the low back no significant improvement and he was going to have surgery           Plan:  Discharge from therapy.    Discharge:    Reason for Discharge: No further expectation of progress.    Equipment Issued: none    Discharge Plan: Patient to continue home program.

## 2019-05-13 ENCOUNTER — TRANSFERRED RECORDS (OUTPATIENT)
Dept: HEALTH INFORMATION MANAGEMENT | Facility: CLINIC | Age: 59
End: 2019-05-13

## 2019-05-14 ENCOUNTER — ANESTHESIA EVENT (OUTPATIENT)
Dept: SURGERY | Facility: CLINIC | Age: 59
End: 2019-05-14
Payer: COMMERCIAL

## 2019-05-14 ASSESSMENT — LIFESTYLE VARIABLES: TOBACCO_USE: 1

## 2019-05-15 ENCOUNTER — ANESTHESIA (OUTPATIENT)
Dept: SURGERY | Facility: CLINIC | Age: 59
End: 2019-05-15
Payer: COMMERCIAL

## 2019-05-15 ENCOUNTER — HOSPITAL ENCOUNTER (OUTPATIENT)
Facility: CLINIC | Age: 59
Discharge: HOME | End: 2019-05-15
Attending: NEUROLOGICAL SURGERY | Admitting: NEUROLOGICAL SURGERY
Payer: COMMERCIAL

## 2019-05-15 ENCOUNTER — HOSPITAL ENCOUNTER (OUTPATIENT)
Dept: GENERAL RADIOLOGY | Facility: CLINIC | Age: 59
End: 2019-05-15
Attending: NEUROLOGICAL SURGERY | Admitting: NEUROLOGICAL SURGERY
Payer: COMMERCIAL

## 2019-05-15 VITALS
BODY MASS INDEX: 32.3 KG/M2 | DIASTOLIC BLOOD PRESSURE: 74 MMHG | HEART RATE: 86 BPM | TEMPERATURE: 98.8 F | WEIGHT: 201 LBS | SYSTOLIC BLOOD PRESSURE: 106 MMHG | OXYGEN SATURATION: 95 % | HEIGHT: 66 IN | RESPIRATION RATE: 20 BRPM

## 2019-05-15 DIAGNOSIS — M48.061 LUMBAR STENOSIS: ICD-10-CM

## 2019-05-15 DIAGNOSIS — Z98.890 S/P LUMBAR LAMINECTOMY: Primary | ICD-10-CM

## 2019-05-15 LAB
GLUCOSE BLDC GLUCOMTR-MCNC: 113 MG/DL (ref 70–99)
GLUCOSE BLDC GLUCOMTR-MCNC: 128 MG/DL (ref 70–99)

## 2019-05-15 PROCEDURE — 25000128 H RX IP 250 OP 636: Performed by: NEUROLOGICAL SURGERY

## 2019-05-15 PROCEDURE — 25800030 ZZH RX IP 258 OP 636: Performed by: NURSE ANESTHETIST, CERTIFIED REGISTERED

## 2019-05-15 PROCEDURE — 40000277 XR SURGERY CARM FLUORO LESS THAN 5 MIN W STILLS: Mod: TC

## 2019-05-15 PROCEDURE — 82962 GLUCOSE BLOOD TEST: CPT

## 2019-05-15 PROCEDURE — C1894 INTRO/SHEATH, NON-LASER: HCPCS | Performed by: NEUROLOGICAL SURGERY

## 2019-05-15 PROCEDURE — 25000128 H RX IP 250 OP 636: Performed by: NURSE ANESTHETIST, CERTIFIED REGISTERED

## 2019-05-15 PROCEDURE — 36000065 ZZH SURGERY LEVEL 4 W FLUORO 1ST 30 MIN: Performed by: NEUROLOGICAL SURGERY

## 2019-05-15 PROCEDURE — 40000306 ZZH STATISTIC PRE PROC ASSESS II: Performed by: NEUROLOGICAL SURGERY

## 2019-05-15 PROCEDURE — 71000014 ZZH RECOVERY PHASE 1 LEVEL 2 FIRST HR: Performed by: NEUROLOGICAL SURGERY

## 2019-05-15 PROCEDURE — 27110028 ZZH OR GENERAL SUPPLY NON-STERILE: Performed by: NEUROLOGICAL SURGERY

## 2019-05-15 PROCEDURE — 25000566 ZZH SEVOFLURANE, EA 15 MIN: Performed by: NEUROLOGICAL SURGERY

## 2019-05-15 PROCEDURE — 25000125 ZZHC RX 250: Performed by: NURSE ANESTHETIST, CERTIFIED REGISTERED

## 2019-05-15 PROCEDURE — 36000063 ZZH SURGERY LEVEL 4 EA 15 ADDTL MIN: Performed by: NEUROLOGICAL SURGERY

## 2019-05-15 PROCEDURE — 37000009 ZZH ANESTHESIA TECHNICAL FEE, EACH ADDTL 15 MIN: Performed by: NEUROLOGICAL SURGERY

## 2019-05-15 PROCEDURE — 71000027 ZZH RECOVERY PHASE 2 EACH 15 MINS: Performed by: NEUROLOGICAL SURGERY

## 2019-05-15 PROCEDURE — 27210794 ZZH OR GENERAL SUPPLY STERILE: Performed by: NEUROLOGICAL SURGERY

## 2019-05-15 PROCEDURE — 63047 LAM FACETEC & FORAMOT LUMBAR: CPT | Mod: AS | Performed by: PHYSICIAN ASSISTANT

## 2019-05-15 PROCEDURE — 25000125 ZZHC RX 250: Performed by: NEUROLOGICAL SURGERY

## 2019-05-15 PROCEDURE — 63047 LAM FACETEC & FORAMOT LUMBAR: CPT | Performed by: NEUROLOGICAL SURGERY

## 2019-05-15 PROCEDURE — 37000008 ZZH ANESTHESIA TECHNICAL FEE, 1ST 30 MIN: Performed by: NEUROLOGICAL SURGERY

## 2019-05-15 PROCEDURE — 25000128 H RX IP 250 OP 636: Performed by: PHYSICIAN ASSISTANT

## 2019-05-15 RX ORDER — CEFAZOLIN SODIUM 2 G/100ML
2 INJECTION, SOLUTION INTRAVENOUS
Status: COMPLETED | OUTPATIENT
Start: 2019-05-15 | End: 2019-05-15

## 2019-05-15 RX ORDER — NALOXONE HYDROCHLORIDE 0.4 MG/ML
.1-.4 INJECTION, SOLUTION INTRAMUSCULAR; INTRAVENOUS; SUBCUTANEOUS
Status: DISCONTINUED | OUTPATIENT
Start: 2019-05-15 | End: 2019-05-15 | Stop reason: HOSPADM

## 2019-05-15 RX ORDER — DIMENHYDRINATE 50 MG/ML
25 INJECTION, SOLUTION INTRAMUSCULAR; INTRAVENOUS
Status: DISCONTINUED | OUTPATIENT
Start: 2019-05-15 | End: 2019-05-15 | Stop reason: HOSPADM

## 2019-05-15 RX ORDER — ONDANSETRON 4 MG/1
4 TABLET, ORALLY DISINTEGRATING ORAL EVERY 30 MIN PRN
Status: DISCONTINUED | OUTPATIENT
Start: 2019-05-15 | End: 2019-05-15 | Stop reason: HOSPADM

## 2019-05-15 RX ORDER — FENTANYL CITRATE 50 UG/ML
25-50 INJECTION, SOLUTION INTRAMUSCULAR; INTRAVENOUS
Status: DISCONTINUED | OUTPATIENT
Start: 2019-05-15 | End: 2019-05-15 | Stop reason: HOSPADM

## 2019-05-15 RX ORDER — HYDROMORPHONE HYDROCHLORIDE 1 MG/ML
.3-.5 INJECTION, SOLUTION INTRAMUSCULAR; INTRAVENOUS; SUBCUTANEOUS EVERY 10 MIN PRN
Status: DISCONTINUED | OUTPATIENT
Start: 2019-05-15 | End: 2019-05-15 | Stop reason: HOSPADM

## 2019-05-15 RX ORDER — METHOCARBAMOL 750 MG/1
750-1500 TABLET, FILM COATED ORAL
Status: DISCONTINUED | OUTPATIENT
Start: 2019-05-15 | End: 2019-05-15 | Stop reason: HOSPADM

## 2019-05-15 RX ORDER — AMOXICILLIN 250 MG
1-2 CAPSULE ORAL 2 TIMES DAILY
Qty: 30 TABLET | Refills: 0 | Status: ON HOLD | OUTPATIENT
Start: 2019-05-15 | End: 2019-08-28

## 2019-05-15 RX ORDER — LIDOCAINE HYDROCHLORIDE 20 MG/ML
INJECTION, SOLUTION INFILTRATION; PERINEURAL PRN
Status: DISCONTINUED | OUTPATIENT
Start: 2019-05-15 | End: 2019-05-15

## 2019-05-15 RX ORDER — ONDANSETRON 2 MG/ML
INJECTION INTRAMUSCULAR; INTRAVENOUS PRN
Status: DISCONTINUED | OUTPATIENT
Start: 2019-05-15 | End: 2019-05-15

## 2019-05-15 RX ORDER — ACETAMINOPHEN 500 MG
1000 TABLET ORAL 2 TIMES DAILY PRN
COMMUNITY

## 2019-05-15 RX ORDER — CEFAZOLIN SODIUM 1 G/3ML
1 INJECTION, POWDER, FOR SOLUTION INTRAMUSCULAR; INTRAVENOUS SEE ADMIN INSTRUCTIONS
Status: DISCONTINUED | OUTPATIENT
Start: 2019-05-15 | End: 2019-05-15 | Stop reason: HOSPADM

## 2019-05-15 RX ORDER — TIZANIDINE 2 MG/1
2-4 TABLET ORAL 3 TIMES DAILY
Qty: 60 TABLET | Refills: 1 | Status: SHIPPED | OUTPATIENT
Start: 2019-05-15 | End: 2019-06-24

## 2019-05-15 RX ORDER — SODIUM CHLORIDE, SODIUM LACTATE, POTASSIUM CHLORIDE, CALCIUM CHLORIDE 600; 310; 30; 20 MG/100ML; MG/100ML; MG/100ML; MG/100ML
INJECTION, SOLUTION INTRAVENOUS CONTINUOUS
Status: DISCONTINUED | OUTPATIENT
Start: 2019-05-15 | End: 2019-05-15 | Stop reason: HOSPADM

## 2019-05-15 RX ORDER — OXYCODONE HYDROCHLORIDE 5 MG/1
5-10 TABLET ORAL
Status: DISCONTINUED | OUTPATIENT
Start: 2019-05-15 | End: 2019-05-15 | Stop reason: HOSPADM

## 2019-05-15 RX ORDER — HYDROXYZINE HYDROCHLORIDE 25 MG/1
25 TABLET, FILM COATED ORAL
Status: DISCONTINUED | OUTPATIENT
Start: 2019-05-15 | End: 2019-05-15 | Stop reason: HOSPADM

## 2019-05-15 RX ORDER — ONDANSETRON 4 MG/1
4 TABLET, ORALLY DISINTEGRATING ORAL
Status: DISCONTINUED | OUTPATIENT
Start: 2019-05-15 | End: 2019-05-15 | Stop reason: HOSPADM

## 2019-05-15 RX ORDER — ONDANSETRON 2 MG/ML
4 INJECTION INTRAMUSCULAR; INTRAVENOUS EVERY 30 MIN PRN
Status: DISCONTINUED | OUTPATIENT
Start: 2019-05-15 | End: 2019-05-15 | Stop reason: HOSPADM

## 2019-05-15 RX ORDER — OXYCODONE HYDROCHLORIDE 5 MG/1
5-10 TABLET ORAL EVERY 4 HOURS PRN
Qty: 30 TABLET | Refills: 0 | Status: SHIPPED | OUTPATIENT
Start: 2019-05-15 | End: 2019-06-24

## 2019-05-15 RX ORDER — DEXAMETHASONE SODIUM PHOSPHATE 10 MG/ML
INJECTION, SOLUTION INTRAMUSCULAR; INTRAVENOUS PRN
Status: DISCONTINUED | OUTPATIENT
Start: 2019-05-15 | End: 2019-05-15

## 2019-05-15 RX ORDER — PROPOFOL 10 MG/ML
INJECTION, EMULSION INTRAVENOUS PRN
Status: DISCONTINUED | OUTPATIENT
Start: 2019-05-15 | End: 2019-05-15

## 2019-05-15 RX ORDER — LIDOCAINE HYDROCHLORIDE AND EPINEPHRINE 10; 10 MG/ML; UG/ML
INJECTION, SOLUTION INFILTRATION; PERINEURAL PRN
Status: DISCONTINUED | OUTPATIENT
Start: 2019-05-15 | End: 2019-05-15 | Stop reason: HOSPADM

## 2019-05-15 RX ORDER — FENTANYL CITRATE 50 UG/ML
INJECTION, SOLUTION INTRAMUSCULAR; INTRAVENOUS PRN
Status: DISCONTINUED | OUTPATIENT
Start: 2019-05-15 | End: 2019-05-15

## 2019-05-15 RX ORDER — MEPERIDINE HYDROCHLORIDE 25 MG/ML
12.5 INJECTION INTRAMUSCULAR; INTRAVENOUS; SUBCUTANEOUS
Status: DISCONTINUED | OUTPATIENT
Start: 2019-05-15 | End: 2019-05-15 | Stop reason: HOSPADM

## 2019-05-15 RX ADMIN — FENTANYL CITRATE 50 MCG: 50 INJECTION, SOLUTION INTRAMUSCULAR; INTRAVENOUS at 11:36

## 2019-05-15 RX ADMIN — SODIUM CHLORIDE, POTASSIUM CHLORIDE, SODIUM LACTATE AND CALCIUM CHLORIDE: 600; 310; 30; 20 INJECTION, SOLUTION INTRAVENOUS at 09:42

## 2019-05-15 RX ADMIN — LIDOCAINE HYDROCHLORIDE 1 ML: 10 INJECTION, SOLUTION EPIDURAL; INFILTRATION; INTRACAUDAL; PERINEURAL at 09:42

## 2019-05-15 RX ADMIN — HYDROMORPHONE HYDROCHLORIDE 0.5 MG: 1 INJECTION, SOLUTION INTRAMUSCULAR; INTRAVENOUS; SUBCUTANEOUS at 13:31

## 2019-05-15 RX ADMIN — SODIUM CHLORIDE, POTASSIUM CHLORIDE, SODIUM LACTATE AND CALCIUM CHLORIDE: 600; 310; 30; 20 INJECTION, SOLUTION INTRAVENOUS at 12:12

## 2019-05-15 RX ADMIN — LIDOCAINE HYDROCHLORIDE 100 MG: 20 INJECTION, SOLUTION INFILTRATION; PERINEURAL at 11:39

## 2019-05-15 RX ADMIN — CEFAZOLIN SODIUM 2 G: 2 INJECTION, SOLUTION INTRAVENOUS at 11:48

## 2019-05-15 RX ADMIN — DEXAMETHASONE SODIUM PHOSPHATE 8 MG: 10 INJECTION, SOLUTION INTRAMUSCULAR; INTRAVENOUS at 11:51

## 2019-05-15 RX ADMIN — ROCURONIUM BROMIDE 50 MG: 10 INJECTION INTRAVENOUS at 11:39

## 2019-05-15 RX ADMIN — PHENYLEPHRINE HYDROCHLORIDE 100 MCG: 10 INJECTION, SOLUTION INTRAMUSCULAR; INTRAVENOUS; SUBCUTANEOUS at 12:18

## 2019-05-15 RX ADMIN — MIDAZOLAM 1 MG: 1 INJECTION INTRAMUSCULAR; INTRAVENOUS at 11:36

## 2019-05-15 RX ADMIN — FENTANYL CITRATE 50 MCG: 50 INJECTION INTRAMUSCULAR; INTRAVENOUS at 13:33

## 2019-05-15 RX ADMIN — ROCURONIUM BROMIDE 10 MG: 10 INJECTION INTRAVENOUS at 12:33

## 2019-05-15 RX ADMIN — PROPOFOL 200 MG: 10 INJECTION, EMULSION INTRAVENOUS at 11:39

## 2019-05-15 RX ADMIN — FENTANYL CITRATE 50 MCG: 50 INJECTION, SOLUTION INTRAMUSCULAR; INTRAVENOUS at 11:39

## 2019-05-15 RX ADMIN — ONDANSETRON 4 MG: 2 INJECTION INTRAMUSCULAR; INTRAVENOUS at 12:54

## 2019-05-15 RX ADMIN — PHENYLEPHRINE HYDROCHLORIDE 100 MCG: 10 INJECTION, SOLUTION INTRAMUSCULAR; INTRAVENOUS; SUBCUTANEOUS at 12:26

## 2019-05-15 RX ADMIN — MIDAZOLAM 1 MG: 1 INJECTION INTRAMUSCULAR; INTRAVENOUS at 11:33

## 2019-05-15 RX ADMIN — PHENYLEPHRINE HYDROCHLORIDE 100 MCG: 10 INJECTION, SOLUTION INTRAMUSCULAR; INTRAVENOUS; SUBCUTANEOUS at 12:00

## 2019-05-15 RX ADMIN — PHENYLEPHRINE HYDROCHLORIDE 100 MCG: 10 INJECTION, SOLUTION INTRAMUSCULAR; INTRAVENOUS; SUBCUTANEOUS at 12:09

## 2019-05-15 RX ADMIN — SUGAMMADEX 200 MG: 100 INJECTION, SOLUTION INTRAVENOUS at 13:07

## 2019-05-15 ASSESSMENT — MIFFLIN-ST. JEOR: SCORE: 1669.48

## 2019-05-15 NOTE — ANESTHESIA PREPROCEDURE EVALUATION
Anesthesia Pre-Procedure Evaluation    Patient: Liborio Noguera   MRN: [id number] : 1960          Preoperative Diagnosis: lumbar stenosis    Procedure(s):  LUMBAR 4-5 LAMINECTOMY/DISCECTOMY LUMBAR    No past medical history on file.  History reviewed. No pertinent surgical history.    Anesthesia Evaluation     . Pt has had prior anesthetic. Type: General    No history of anesthetic complications          ROS/MED HX    ENT/Pulmonary:     (+)tobacco use, Past use , . .    Neurologic:  - neg neurologic ROS     Cardiovascular:     (+) Dyslipidemia, hypertension----. : . . . :. . No previous cardiac testing       METS/Exercise Tolerance:  4 - Raking leaves, gardening   Hematologic:  - neg hematologic  ROS       Musculoskeletal:   (+)  other musculoskeletal- chronic lumbar back pain      GI/Hepatic:  - neg GI/hepatic ROS       Renal/Genitourinary:  - ROS Renal section negative       Endo:     (+) type II DM Last HgA1c: 5.4 date: 4/3/19 Not using insulin - not using insulin pump Normal glucose range: low 100's .      Psychiatric:     (+) psychiatric history depression      Infectious Disease:  - neg infectious disease ROS       Malignancy:      - no malignancy   Other:    (+) H/O Chronic Pain,                        Physical Exam  Normal systems: cardiovascular and pulmonary    Airway   Mallampati: III  TM distance: >3 FB  Neck ROM: full    Dental   (+) caps    Cardiovascular   Rhythm and rate: regular and normal      Pulmonary    breath sounds clear to auscultation            No results found for: WBC, HGB, HCT, PLT, CRP, SED, NA, POTASSIUM, CHLORIDE, CO2, BUN, CR, GLC, DOLORES, PHOS, MAG, ALBUMIN, PROTTOTAL, ALT, AST, GGT, ALKPHOS, BILITOTAL, BILIDIRECT, LIPASE, AMYLASE, CHARLIE, PTT, INR, FIBR, TSH, T4, T3, HCG, HCGS, CKTOTAL, CKMB, TROPN    Preop Vitals  BP Readings from Last 3 Encounters:   19 115/73   19 124/68    Pulse Readings from Last 3 Encounters:   19 110      Resp Readings from Last 3  Encounters:   No data found for Resp    SpO2 Readings from Last 3 Encounters:   No data found for SpO2      Temp Readings from Last 1 Encounters:   03/22/19 98.2  F (36.8  C) (Temporal)    Ht Readings from Last 1 Encounters:   No data found for Ht      Wt Readings from Last 1 Encounters:   04/18/19 91.2 kg (201 lb)    There is no height or weight on file to calculate BMI.       Anesthesia Plan      History & Physical Review  History and physical reviewed and following examination; no interval change.    ASA Status:  3 .    NPO Status:  > 8 hours    Plan for General, RSI and ETT with Intravenous and Propofol induction. Maintenance will be Balanced.    PONV prophylaxis:  Ondansetron (or other 5HT-3) and Dexamethasone or Solumedrol       Postoperative Care  Postoperative pain management:  IV analgesics and Oral pain medications.      Consents  Anesthetic plan, risks, benefits and alternatives discussed with:  Patient.  Use of blood products discussed: No .   .                 JOEL Garcia CRNA

## 2019-05-15 NOTE — BRIEF OP NOTE
[facility]    Brief Operative Note    Pre-operative diagnosis: lumbar stenosis  Post-operative diagnosis same  Procedure: Procedure(s):  LUMBAR 4-5 LAMINECTOMY/DISCECTOMY LUMBAR  Surgeon: Surgeon(s) and Role:     * Reza Ya MD - Primary     * Nima Wheeler PA-C - Assisting  Anesthesia: General   Estimated blood loss: 50 mL  Drains: None  Specimens: * No specimens in log *  Findings:   None.  Complications: None.  Implants:  * No implants in log *       Nima Wheeler PA-C  [facility]

## 2019-05-15 NOTE — ANESTHESIA CARE TRANSFER NOTE
Patient: Liborio Noguera    Procedure(s):  LUMBAR 4-5 LAMINECTOMY/DISCECTOMY LUMBAR    Diagnosis: lumbar stenosis  Diagnosis Additional Information: No value filed.    Anesthesia Type:   General, RSI, ETT     Note:  Airway :Nasal Cannula  Patient transferred to:PACU  Handoff Report: Identifed the Patient, Identified the Reponsible Provider, Reviewed the pertinent medical history, Discussed the surgical course, Reviewed Intra-OP anesthesia mangement and issues during anesthesia, Set expectations for post-procedure period and Allowed opportunity for questions and acknowledgement of understanding      Vitals: (Last set prior to Anesthesia Care Transfer)    CRNA VITALS  5/15/2019 1245 - 5/15/2019 1326      5/15/2019             EKG:  Sinus rhythm                Electronically Signed By: JOEL Garcia CRNA  May 15, 2019  1:26 PM

## 2019-05-15 NOTE — OP NOTE
Date of surgery: 5/15/2019  Surgeon: Reza Ya MD  Assistant: STEVE Rosales  Note: Nima Wheeler was present for and assisted with the entire surgery, and his/her role as an assistant was crucial for aid in positioning, exposure, suctioning, retraction, and closure    Preoperative diagnosis: Lumbar stenosis  Postoperative diagnosis: Lumbar stenosis    Procedure:  1.  L4-5 bilateral laminectomy and medial facetectomy for decompression of central stenosis  3.  Use of intraoperative microscope and fluoroscopy    EBL: 25 mL    Indications: 58-year-old male who presented with intractable back and leg pain.  MRI showed severe stenosis at L4-5.  Underwent non-operative management with failure to improve.  Risks, benefits, indications, and alternatives were discussed with the patient in detail, and she wished to proceed.    Description of surgery: The patient was positioned prone.  Sterile prepping and draping procedures were performed.  Antibiotics were administered and timeout was performed.  A midline lumbar incision was performed.  The monopolar was used to expose the spinous processes, lamina, and medial facets at L4-5.  Fluoroscopy was used to verify the correct level.  The high speed drill was then used to perform bilateral laminectomies and medial facetectomies at L4-5 with preservation of the midline tension band.  The Kerrison rongeurs were then used to remove the Ligamentum flavum at L4-5, with decompression of the thecal sac and nerve roots.  Bilateral limited discectomy was performed with the pituitary rongeurs.  Antibiotic irrigation was performed and hemostasis achieved.  The fascia was closed with 0-Vicryl sutures, and the dermal layer was closed with 2-0 vicryl sutures.  There were no intraprocedural complications.

## 2019-05-15 NOTE — DISCHARGE INSTRUCTIONS
Spine Clinic at [facility]   Dr. Ya Discharge Instructions Following Spine Surgery   [phone] ([facility] Office)   Monday - Friday; 8:00 AM - 4:00 PM   In General:   After you have had surgery on your spine, remember do not twist, or excessively flex or extend the area that you had surgery. These activities can prevent healing. Pain is normal and to be expected following surgery. Please call our office to schedule your appointment follow up appointment.   Bowel Care:   Many people have constipation (hard stools) after surgery. To help prevent constipation: Drink plenty of fluid (8-10 glasses/day); Eat more fiber, such as whole grain bread, bran cereal, and fruits and vegetables; Stay active by walking; Over the counter stool softener may also help.   Medications:   Spine surgery and pain management is unique to all patients. You will generally be given medications for pain, muscle spasms or tightness, and for constipation during the immediate post op period. It is important that you use these as prescribed. Please remember to bring your pill bottles to all of your appointments. Avoid driving while taking narcotic pain medications. Avoid alcoholic beverages while taking narcotic pain medications. You can use ice to areas of pain as needed, 20 minutes at a time. Changing positions and walking will help loosen your muscles as well. No NSAIDs (Ibuprofen, Advil, Motrin, Aleve, Naproxen) for 14 days.   Driving:   No driving while on narcotic pain medications. It is state law not to drive while under the influence of a drug to a degree which renders you incapable of safely driving. The narcotic medication you will be taking after surgery falls under this category.   Activity:   After surgery, most people feel less pain than they have had in a long time. Walking and light activities will help you regain the use of your muscles. You are encouraged to walk: start with short walks 5-10 minutes at a time  for 4-5 times per day and increase as tolerated. Stair climbing as tolerated, we recommend you use the railing. No lifting greater than 10 pounds: approximately equal to one gallon of milk. No twisting, bending in the area you have had surgery. No housework, vacuuming, laundry, leaf raking, lawn mowing, or snow removal. Wear your brace (if ordered) as directed.   Showers:   If you have sutures or staples you may shower two days after surgery. It is ok to let water run over your incision but do not touch or scrub on the incision. Pat dry immediately after showering. If there is a dressing in place, you may remove it 2 days after surgery. If you were closed with Derma byrne (glue), you may shower without covering the incision. No baths, hot tubs, or pool activity for at least 6 weeks.   Nutrition:   In general, your diet restrictions will not change with your surgery. You may need to eat small frequent meals initially until your appetite returns. Eat plenty of high fiber foods and drink plenty of fluids. If you do not have a fluid restriction from or prior to surgery, we recommend 6-8 (8oz) glasses of water per day. Other fluids are fine, but water is best. Nausea is not uncommon; it is a common side effect to many pain medications. We recommend that you take the pain medications with food, if this does not improve your symptoms, please call us.   Smoking:   For proper healing it is required that you quit using all tobacco products. This includes smoking, chewing, nicotine gums, and nicotine patches.   Follow-Up Appointment   Neurosurgery Appointment with Teresa Leyva PA-C or Nima Wheeler PA-C at the clinic in 6 weeks. Call [phone] for appointment.  Your appointment is already scheduled.  Call Dr. Ya at [phone] if these occur: Drainage from your incision, increased pain/redness/swelling, temperatures greater than 101.5, increased leg pain or swelling or unrelieved headaches   Go to the nearest Emergency Room  if you experience: chest pain, shortness of breath, neck swelling or swallowing problems  [facility]   Adult Discharge Orders & Instructions     For 24 hours after surgery    1. Get plenty of rest.  A responsible adult must stay with you for at least 24 hours after you leave the hospital.   2. Do not drive or use heavy equipment.  If you have weakness or tingling, don't drive or use heavy equipment until this feeling goes away.  3. Do not drink alcohol.  4. Avoid strenuous or risky activities.  Ask for help when climbing stairs.   5. You may feel lightheaded.  If so, sit for a few minutes before standing.  Have someone help you get up.   6. You may have a slight fever. Call the doctor if your fever is over 100 F (37.7 C) (taken under the tongue) or lasts longer than 24 hours.  7. You may have a dry mouth, a sore throat, muscle aches or trouble sleeping.  These should go away after 24 hours.  8. Do not make important or legal decisions.  We don t expect you to have any problems from the surgery or treatment you had today. Just in case, here s what to do if you have pain, upset stomach (nausea), bleeding or infection:  Pain:  Take medicines your doctor has prescribed or over-the-counter medicine they have suggested. Resting and using ice packs can help, too. For surgery on an arm or leg, raise it on a pillow to ease swelling. Call your doctor if these methods don t work.  Copyright Eleazar Sommer, Licensed under [employer]  Upset stomach (nausea):  Take anti-nausea medicine approved by your doctor. Drink clear liquids like apple juice, ginger ale, broth or 7-Up. Be sure to drink enough fluids. Rest can help, too. Move to normal foods when you re ready. Bleeding:  In the first 24 hours, you may see a little blood on your dressing, about the size of a quarter. You don t need to worry about this much blood, but if the blood spot keeps getting bigger:    Put pressure on the wound if you  can, AND    Call your doctor.  Copyright [employer], Licensed under [employer]  Fever/Infection: Please call your doctor if you have any of these signs:    Redness    Swelling    Wound feels warm    Pain gets worse    Bad-smelling fluid leaks from wound    Fever or chills  Call your doctor for any of the followin.  It has been over 8 to 10 hours since surgery and you are still not able to urinate (pass water).    2.  Headache for over 24 hours.    Nurse advice line: [phone]

## 2019-05-16 NOTE — ANESTHESIA POSTPROCEDURE EVALUATION
Patient: Liborio Noguera    Procedure(s):  LUMBAR 4-5 LAMINECTOMY/DISCECTOMY LUMBAR    Diagnosis:lumbar stenosis  Diagnosis Additional Information: No value filed.    Anesthesia Type:  General, RSI, ETT    Note:  Anesthesia Post Evaluation    Patient location: Pt was discharged.  Called multiple times and left a message.  Patient participation: Able to fully participate in evaluation  Level of consciousness: awake  Pain management: adequate  Airway patency: patent  Cardiovascular status: acceptable and hemodynamically stable  Respiratory status: acceptable, room air and nonlabored ventilation  Hydration status: stable  PONV: none     Anesthetic complications: None    Comments: Unable to reach the pt via phone.  I will follow up with the patient again if it is needed.        Last vitals:  Vitals:    05/15/19 1400 05/15/19 1415 05/15/19 1430   BP: 108/64 104/70 106/74   Pulse:  94 86   Resp: 24  20   Temp:      SpO2: 93%  95%         Electronically Signed By: JOEL Hoyos CRNA  May 16, 2019  1:51 PM

## 2019-05-17 ENCOUNTER — TELEPHONE (OUTPATIENT)
Dept: NEUROSURGERY | Facility: CLINIC | Age: 59
End: 2019-05-17

## 2019-05-17 NOTE — TELEPHONE ENCOUNTER
Contacted patient for post-op follow up call. Patient is s/p L4-5 Laminectomy on 5/15. After surgery patient is doing well overall, but reports 6/10 incisional pain. Pain is controlled by Oxycodone. Patient has been eating and drinking well. No issues with bowel or bladder; advised to increase fluid, fiber intake, and use OTC stool softener if needed. No issues or concerns with incision. Reminded patient to watch for s/sx of infection, use proper incision care, and follow activity restrictions. Patient will call to schedule 6 week post op f/u appt.  All questions were answered to the best of my ability and the patient's satisfaction. Patient verbalized understanding and advised to call with any additional questions or concerns.

## 2019-05-17 NOTE — OR NURSING
[facility] Same Day Surgery  Discharge Call Back  Liborio Noguera  1960  MRN: [id number]  Home: [phone] (home)   PCP: [facility]- [facility] Provider, Radiology    We are calling to see how you're doing since your surgery/procedure with us?   Comments: good  Clinical Questions  1. Have you had time to look at your discharge instructions? Do you have any questions in regards to the instructions?   Comment: no questions  2. Do you feel your pain is being controlled with the regimen the surgeon sent you home on? (ie: prescription medications, over the counter pain medications, ice packs)   Comments: yes  3. Have you noticed any drainage on your dressing? Do you know what to do if you have bleeding as a result of your procedure?   Comments: none/yes  4. Have you had any nausea/vomiting? Do you know how to treat this?   Comment: none/yes  5. Have you had any signs/symptoms of infection? (ie: fever, swelling, heat, drainage or redness) Do you know what to do if you have?   Comment: none/yes  6. Do you have a follow up appointment made with your surgeon? Do you have a number to contact them at if you need it?   Comment: yes        You may be randomly selected to fill out a [facility] survey. We would appreciate you taking the time to fill this out. It is important to us if you would answer all of the questions on the survey.

## 2019-06-12 ENCOUNTER — TELEPHONE (OUTPATIENT)
Dept: NEUROSURGERY | Facility: CLINIC | Age: 59
End: 2019-06-12

## 2019-06-12 NOTE — TELEPHONE ENCOUNTER
Patient called and left a message on the nurse line with questions regarding his incision.    Attempted to return call, no answer, LVM requesting a return call to discuss.

## 2019-06-24 ENCOUNTER — OFFICE VISIT (OUTPATIENT)
Dept: NEUROSURGERY | Facility: CLINIC | Age: 59
End: 2019-06-24
Payer: COMMERCIAL

## 2019-06-24 VITALS
HEIGHT: 66 IN | TEMPERATURE: 97.7 F | SYSTOLIC BLOOD PRESSURE: 92 MMHG | DIASTOLIC BLOOD PRESSURE: 64 MMHG | HEART RATE: 79 BPM | BODY MASS INDEX: 31.07 KG/M2 | WEIGHT: 193.3 LBS

## 2019-06-24 DIAGNOSIS — Z98.890 S/P LUMBAR LAMINECTOMY: Primary | ICD-10-CM

## 2019-06-24 PROCEDURE — 99024 POSTOP FOLLOW-UP VISIT: CPT | Performed by: PHYSICIAN ASSISTANT

## 2019-06-24 ASSESSMENT — PAIN SCALES - GENERAL: PAINLEVEL: SEVERE PAIN (6)

## 2019-06-24 ASSESSMENT — MIFFLIN-ST. JEOR: SCORE: 1634.55

## 2019-06-24 NOTE — LETTER
6/24/2019         RE: Liborio Noguera  [address] [address] [facility] [place] [address]        Dear Colleague,    Thank you for referring your patient, Liborio Noguera, to the [facility]. Please see a copy of my visit note below.    [facility]  Neurosurgery followup:    HPI: 58-year-old male, 6 weeks out from L4-5 laminectomy/discectomy.  He does continue with some right hip pain, as well as some paresthesias in the right lower extremity.  He has some numbness in his right foot and states that it occasionally feels a warm.  He does have a lot of right-sided groin pain, consistent with possibly some hip pathology.  He states it is difficult to go up and down the stairs because of this.  Exam:  Constitutional:  Alert, well nourished, NAD.  HEENT: Normocephalic, atraumatic.   Pulm:  Without shortness of breath   CV:  No pitting edema of BLE.      Neurological:  Awake  Alert  Oriented x 3  Motor exam:        IP Q DF PF EHL  R   5  5   5   5    5  L   5  5   5   5    5     Reflexes are 2+ in the patellar and Achilles. There is no clonus. Downgoing Babinski.      Able to spontaneously move L/E bilaterally  Sensation intact throughout all L/E dermatomes     Incision: Well healed.       A/P:  6-week status post L4-5 microdiscectomy and laminectomy.  He does still have a lot of pain in his right leg, mostly in his right hip.  He does have some reproducible anterior groin pain with internal/external rotation of the right leg.  This would be consistent with some hip pathology.  He does have a relationship with an orthopedic surgeon in the [facility], I believe at [facility], and I recommended that he follow-up with this provider.  We will see him back at his regularly scheduled postop appointment in 6 weeks.  He voiced agreement and understanding.        Nima Wheeler PA-C  [facility]  [address]  [address] [place] [address], [place] [address]    Tel [phone]  Pager  [phone]      Again, thank you for allowing me to participate in the care of your patient.        Sincerely,        Nima Wheeler PA-C

## 2019-06-24 NOTE — NURSING NOTE
"Liborio Noguera is a 58 year old male who presents for:  Chief Complaint   Patient presents with     Surgical Followup     s/p L4-5 laminectomy/ discectomy DOS: 5/15/19        Initial Vitals:  BP 92/64   Pulse 79   Temp 97.7  F (36.5  C)   Ht 5' 6\" (1.676 m)   Wt 193 lb 4.8 oz (87.7 kg)   BMI 31.20 kg/m   Estimated body mass index is 31.2 kg/m  as calculated from the following:    Height as of this encounter: 5' 6\" (1.676 m).    Weight as of this encounter: 193 lb 4.8 oz (87.7 kg).. Body surface area is 2.02 meters squared. BP completed using cuff size: large  Severe Pain (6)        Nursing Comments: Weight management plan: Patient was referred to their PCP to discuss a diet and exercise plan.        Bharati Murry" Pt seen today due to RN referral for Pressure injury stage 2 or >. Pt w/ BMI < 19. Unable to obtain usual wt/ diet hx as pt is in pain and no family at bedside. Consumed 100% breakfast meal.

## 2019-06-24 NOTE — PROGRESS NOTES
[facility]  Neurosurgery followup:    HPI: 58-year-old male, 6 weeks out from L4-5 laminectomy/discectomy.  He does continue with some right hip pain, as well as some paresthesias in the right lower extremity.  He has some numbness in his right foot and states that it occasionally feels a warm.  He does have a lot of right-sided groin pain, consistent with possibly some hip pathology.  He states it is difficult to go up and down the stairs because of this.  Exam:  Constitutional:  Alert, well nourished, NAD.  HEENT: Normocephalic, atraumatic.   Pulm:  Without shortness of breath   CV:  No pitting edema of BLE.      Neurological:  Awake  Alert  Oriented x 3  Motor exam:        IP Q DF PF EHL  R   5  5   5   5    5  L   5  5   5   5    5     Reflexes are 2+ in the patellar and Achilles. There is no clonus. Downgoing Babinski.      Able to spontaneously move L/E bilaterally  Sensation intact throughout all L/E dermatomes     Incision: Well healed.       A/P:  6-week status post L4-5 microdiscectomy and laminectomy.  He does still have a lot of pain in his right leg, mostly in his right hip.  He does have some reproducible anterior groin pain with internal/external rotation of the right leg.  This would be consistent with some hip pathology.  He does have a relationship with an orthopedic surgeon in the [facility], I believe at [facility], and I recommended that he follow-up with this provider.  We will see him back at his regularly scheduled postop appointment in 6 weeks.  He voiced agreement and understanding.        Nima Wheeler PA-C  [facility]  [address] [address]    Tel [phone]  Pager [phone]

## 2019-08-15 ENCOUNTER — OFFICE VISIT (OUTPATIENT)
Dept: NEUROSURGERY | Facility: CLINIC | Age: 59
End: 2019-08-15
Payer: COMMERCIAL

## 2019-08-15 VITALS
WEIGHT: 192.9 LBS | BODY MASS INDEX: 31 KG/M2 | RESPIRATION RATE: 17 BRPM | SYSTOLIC BLOOD PRESSURE: 126 MMHG | OXYGEN SATURATION: 97 % | HEIGHT: 66 IN | HEART RATE: 70 BPM | TEMPERATURE: 98.4 F | DIASTOLIC BLOOD PRESSURE: 72 MMHG

## 2019-08-15 DIAGNOSIS — Z98.890 S/P LUMBAR LAMINECTOMY: Primary | ICD-10-CM

## 2019-08-15 PROCEDURE — 99024 POSTOP FOLLOW-UP VISIT: CPT | Performed by: NURSE PRACTITIONER

## 2019-08-15 ASSESSMENT — PAIN SCALES - GENERAL: PAINLEVEL: MODERATE PAIN (5)

## 2019-08-15 ASSESSMENT — MIFFLIN-ST. JEOR: SCORE: 1632.74

## 2019-08-15 NOTE — PROGRESS NOTES
"Liborio Noguera is a 58 year old male who presents for:  Chief Complaint   Patient presents with     RECHECK     S/p L4-L5 lami/discectomy - 90 day f/u        Initial Vitals:  /72   Pulse 70   Temp 98.4  F (36.9  C) (Temporal)   Resp 17   Ht 5' 6\" (1.676 m)   Wt 192 lb 14.4 oz (87.5 kg)   SpO2 97%   BMI 31.13 kg/m   Estimated body mass index is 31.13 kg/m  as calculated from the following:    Height as of this encounter: 5' 6\" (1.676 m).    Weight as of this encounter: 192 lb 14.4 oz (87.5 kg).. Body surface area is 2.02 meters squared. BP completed using cuff size: regular  Moderate Pain (5)        Nursing Comments:         Brabara Santiago CMA    "

## 2019-08-15 NOTE — PATIENT INSTRUCTIONS
- Follow up as needed.  - Call the clinic at [phone] for increased pain or any other questions and concerns.

## 2019-08-15 NOTE — PROGRESS NOTES
[facility]  Neurosurgery followup:    HPI: 58 year old male, 3 months s.p L4-5 laminectomy/discectomy. Today he reports low back stiffness and right foot numbness that is unchanged since surgery. He continues to have right hip pain and is scheduled for a CAMILLA on 8/28/2019. He denies radicular pain. No weakness.  Exam:  Constitutional:  Alert, well nourished, NAD.  HEENT: Normocephalic, atraumatic.   Pulm:  Without shortness of breath   CV:  No pitting edema of BLE.      Neurological:  Awake  Alert  Oriented x 3  Motor exam:        IP Q DF PF EHL  R   5  5   5   5    5  L   5  5   5   5    5     Able to spontaneously move L/E bilaterally  Sensation intact throughout all L/E dermatomes     Incisions:  Healing nicely    A/P: 58 year old male, 3 months s.p L4-5 laminectomy/discectomy. Today he reports low back stiffness and right foot numbness that is unchanged since surgery. He continues to have right hip pain and is scheduled for a CAMILLA on 8/28/2019. He denies radicular pain. No weakness. Follow up as needed. He verbalized understanding and agreement.    Patient Instructions   - Follow up as needed.  - Call the clinic at [phone] for increased pain or any other questions and concerns.    Yvette Pineda, CNP  [facility]  [address] [address]    Tel [phone]

## 2019-08-15 NOTE — LETTER
"    8/15/2019         RE: Liborio Noguera  [address] [address] [place] [address]        Dear Colleague,    Thank you for referring your patient, Liborio Noguera, to the [facility]. Please see a copy of my visit note below.    [facility]  Neurosurgery followup:    HPI: 58 year old male, 3 months s.p L4-5 laminectomy/discectomy. Today he reports low back stiffness and right foot numbness that is unchanged since surgery. He continues to have right hip pain and is scheduled for a CAMILLA on 8/28/2019. He denies radicular pain. No weakness.  Exam:  Constitutional:  Alert, well nourished, NAD.  HEENT: Normocephalic, atraumatic.   Pulm:  Without shortness of breath   CV:  No pitting edema of BLE.      Neurological:  Awake  Alert  Oriented x 3  Motor exam:        IP Q DF PF EHL  R   5  5   5   5    5  L   5  5   5   5    5     Able to spontaneously move L/E bilaterally  Sensation intact throughout all L/E dermatomes     Incisions:  Healing nicely    A/P: 58 year old male, 3 months s.p L4-5 laminectomy/discectomy. Today he reports low back stiffness and right foot numbness that is unchanged since surgery. He continues to have right hip pain and is scheduled for a CAMILLA on 8/28/2019. He denies radicular pain. No weakness. Follow up as needed. He verbalized understanding and agreement.    Patient Instructions   - Follow up as needed.  - Call the clinic at [phone] for increased pain or any other questions and concerns.    Yvette Pineda CNP  [facility]  [address]  [address] [place] [address]    Tel [phone]      Liborio Noguera is a 58 year old male who presents for:  Chief Complaint   Patient presents with     RECHECK     S/p L4-L5 lami/discectomy - 90 day f/u        Initial Vitals:  /72   Pulse 70   Temp 98.4  F (36.9  C) (Temporal)   Resp 17   Ht 5' 6\" (1.676 m)   Wt 192 lb 14.4 oz (87.5 kg)   SpO2 97%   " "BMI 31.13 kg/m    Estimated body mass index is 31.13 kg/m  as calculated from the following:    Height as of this encounter: 5' 6\" (1.676 m).    Weight as of this encounter: 192 lb 14.4 oz (87.5 kg).. Body surface area is 2.02 meters squared. BP completed using cuff size: regular  Moderate Pain (5)        Nursing Comments:         Barbara Santiago CMA      Again, thank you for allowing me to participate in the care of your patient.        Sincerely,        Yvette Pineda NP    "

## 2019-08-22 ENCOUNTER — DOCUMENTATION ONLY (OUTPATIENT)
Dept: EDUCATION SERVICES | Facility: CLINIC | Age: 59
End: 2019-08-22

## 2019-08-22 NOTE — PROGRESS NOTES
Pt. Notified PLC that he did the on line class for joint replacement and had no further questions or concerns.

## 2019-08-27 ENCOUNTER — ANESTHESIA EVENT (OUTPATIENT)
Dept: SURGERY | Facility: CLINIC | Age: 59
DRG: 470 | End: 2019-08-27
Payer: COMMERCIAL

## 2019-08-28 ENCOUNTER — ANESTHESIA (OUTPATIENT)
Dept: SURGERY | Facility: CLINIC | Age: 59
DRG: 470 | End: 2019-08-28
Payer: COMMERCIAL

## 2019-08-28 ENCOUNTER — APPOINTMENT (OUTPATIENT)
Dept: PHYSICAL THERAPY | Facility: CLINIC | Age: 59
DRG: 470 | End: 2019-08-28
Attending: ORTHOPAEDIC SURGERY
Payer: COMMERCIAL

## 2019-08-28 ENCOUNTER — APPOINTMENT (OUTPATIENT)
Dept: GENERAL RADIOLOGY | Facility: CLINIC | Age: 59
DRG: 470 | End: 2019-08-28
Attending: ORTHOPAEDIC SURGERY
Payer: COMMERCIAL

## 2019-08-28 ENCOUNTER — HOSPITAL ENCOUNTER (INPATIENT)
Facility: CLINIC | Age: 59
LOS: 1 days | Discharge: HOME | DRG: 470 | End: 2019-08-29
Attending: ORTHOPAEDIC SURGERY | Admitting: ORTHOPAEDIC SURGERY
Payer: COMMERCIAL

## 2019-08-28 DIAGNOSIS — Z96.641 HISTORY OF TOTAL RIGHT HIP REPLACEMENT: Primary | ICD-10-CM

## 2019-08-28 PROBLEM — Z96.649 H/O TOTAL HIP ARTHROPLASTY: Status: ACTIVE | Noted: 2019-08-28

## 2019-08-28 LAB
ABO + RH BLD: NORMAL
ABO + RH BLD: NORMAL
BLD GP AB SCN SERPL QL: NORMAL
BLOOD BANK CMNT PATIENT-IMP: NORMAL
CREAT SERPL-MCNC: 1.06 MG/DL (ref 0.66–1.25)
GFR SERPL CREATININE-BSD FRML MDRD: 76 ML/MIN/{1.73_M2}
GLUCOSE BLDC GLUCOMTR-MCNC: 128 MG/DL (ref 70–99)
GLUCOSE BLDC GLUCOMTR-MCNC: 132 MG/DL (ref 70–99)
GLUCOSE BLDC GLUCOMTR-MCNC: 138 MG/DL (ref 70–99)
GLUCOSE BLDC GLUCOMTR-MCNC: 140 MG/DL (ref 70–99)
GLUCOSE SERPL-MCNC: 105 MG/DL (ref 70–99)
POTASSIUM SERPL-SCNC: 3.4 MMOL/L (ref 3.4–5.3)
SPECIMEN EXP DATE BLD: NORMAL

## 2019-08-28 PROCEDURE — 25000566 ZZH SEVOFLURANE, EA 15 MIN: Performed by: ORTHOPAEDIC SURGERY

## 2019-08-28 PROCEDURE — 86900 BLOOD TYPING SEROLOGIC ABO: CPT | Performed by: ANESTHESIOLOGY

## 2019-08-28 PROCEDURE — 82565 ASSAY OF CREATININE: CPT | Performed by: ANESTHESIOLOGY

## 2019-08-28 PROCEDURE — 27210794 ZZH OR GENERAL SUPPLY STERILE: Performed by: ORTHOPAEDIC SURGERY

## 2019-08-28 PROCEDURE — 25000128 H RX IP 250 OP 636: Performed by: ORTHOPAEDIC SURGERY

## 2019-08-28 PROCEDURE — 97530 THERAPEUTIC ACTIVITIES: CPT | Mod: GP

## 2019-08-28 PROCEDURE — 25800030 ZZH RX IP 258 OP 636: Performed by: ANESTHESIOLOGY

## 2019-08-28 PROCEDURE — 36415 COLL VENOUS BLD VENIPUNCTURE: CPT | Performed by: ANESTHESIOLOGY

## 2019-08-28 PROCEDURE — 25800030 ZZH RX IP 258 OP 636: Performed by: PHYSICIAN ASSISTANT

## 2019-08-28 PROCEDURE — 99221 1ST HOSP IP/OBS SF/LOW 40: CPT | Performed by: PHYSICIAN ASSISTANT

## 2019-08-28 PROCEDURE — 25800030 ZZH RX IP 258 OP 636: Performed by: NURSE ANESTHETIST, CERTIFIED REGISTERED

## 2019-08-28 PROCEDURE — 25000128 H RX IP 250 OP 636: Performed by: ANESTHESIOLOGY

## 2019-08-28 PROCEDURE — 84132 ASSAY OF SERUM POTASSIUM: CPT | Performed by: ANESTHESIOLOGY

## 2019-08-28 PROCEDURE — 99207 ZZC CONSULT E&M CHANGED TO INITIAL LEVEL: CPT | Performed by: PHYSICIAN ASSISTANT

## 2019-08-28 PROCEDURE — 86850 RBC ANTIBODY SCREEN: CPT | Performed by: ANESTHESIOLOGY

## 2019-08-28 PROCEDURE — 25000128 H RX IP 250 OP 636: Performed by: PHYSICIAN ASSISTANT

## 2019-08-28 PROCEDURE — P9041 ALBUMIN (HUMAN),5%, 50ML: HCPCS | Performed by: NURSE ANESTHETIST, CERTIFIED REGISTERED

## 2019-08-28 PROCEDURE — 40000985 XR PELVIS PORT 1/2 VW

## 2019-08-28 PROCEDURE — 25000132 ZZH RX MED GY IP 250 OP 250 PS 637: Performed by: ORTHOPAEDIC SURGERY

## 2019-08-28 PROCEDURE — 71000012 ZZH RECOVERY PHASE 1 LEVEL 1 FIRST HR: Performed by: ORTHOPAEDIC SURGERY

## 2019-08-28 PROCEDURE — 40000170 ZZH STATISTIC PRE-PROCEDURE ASSESSMENT II: Performed by: ORTHOPAEDIC SURGERY

## 2019-08-28 PROCEDURE — 25000125 ZZHC RX 250: Performed by: ANESTHESIOLOGY

## 2019-08-28 PROCEDURE — 36000093 ZZH SURGERY LEVEL 4 1ST 30 MIN: Performed by: ORTHOPAEDIC SURGERY

## 2019-08-28 PROCEDURE — 37000008 ZZH ANESTHESIA TECHNICAL FEE, 1ST 30 MIN: Performed by: ORTHOPAEDIC SURGERY

## 2019-08-28 PROCEDURE — 37000009 ZZH ANESTHESIA TECHNICAL FEE, EACH ADDTL 15 MIN: Performed by: ORTHOPAEDIC SURGERY

## 2019-08-28 PROCEDURE — 25800030 ZZH RX IP 258 OP 636: Performed by: ORTHOPAEDIC SURGERY

## 2019-08-28 PROCEDURE — C1776 JOINT DEVICE (IMPLANTABLE): HCPCS | Performed by: ORTHOPAEDIC SURGERY

## 2019-08-28 PROCEDURE — 00000146 ZZHCL STATISTIC GLUCOSE BY METER IP

## 2019-08-28 PROCEDURE — 36000063 ZZH SURGERY LEVEL 4 EA 15 ADDTL MIN: Performed by: ORTHOPAEDIC SURGERY

## 2019-08-28 PROCEDURE — 86901 BLOOD TYPING SEROLOGIC RH(D): CPT | Performed by: ANESTHESIOLOGY

## 2019-08-28 PROCEDURE — 82947 ASSAY GLUCOSE BLOOD QUANT: CPT | Performed by: ANESTHESIOLOGY

## 2019-08-28 PROCEDURE — 25800025 ZZH RX 258: Performed by: ORTHOPAEDIC SURGERY

## 2019-08-28 PROCEDURE — 25000128 H RX IP 250 OP 636: Performed by: NURSE ANESTHETIST, CERTIFIED REGISTERED

## 2019-08-28 PROCEDURE — C1713 ANCHOR/SCREW BN/BN,TIS/BN: HCPCS | Performed by: ORTHOPAEDIC SURGERY

## 2019-08-28 PROCEDURE — 25000125 ZZHC RX 250: Performed by: NURSE ANESTHETIST, CERTIFIED REGISTERED

## 2019-08-28 PROCEDURE — 12000000 ZZH R&B MED SURG/OB

## 2019-08-28 PROCEDURE — 25000125 ZZHC RX 250: Performed by: ORTHOPAEDIC SURGERY

## 2019-08-28 PROCEDURE — 97116 GAIT TRAINING THERAPY: CPT | Mod: GP

## 2019-08-28 PROCEDURE — 25000125 ZZHC RX 250: Performed by: PHYSICIAN ASSISTANT

## 2019-08-28 PROCEDURE — 97161 PT EVAL LOW COMPLEX 20 MIN: CPT | Mod: GP

## 2019-08-28 PROCEDURE — 0SR90JA REPLACEMENT OF RIGHT HIP JOINT WITH SYNTHETIC SUBSTITUTE, UNCEMENTED, OPEN APPROACH: ICD-10-PCS | Performed by: ORTHOPAEDIC SURGERY

## 2019-08-28 DEVICE — IMP HEAD FEMORAL DEPUY CERAMIC 36MM +8MM 1365-36-330: Type: IMPLANTABLE DEVICE | Site: HIP | Status: FUNCTIONAL

## 2019-08-28 DEVICE — IMP SCR BONE CAN ACE 6.5X25MM 1217-25-500: Type: IMPLANTABLE DEVICE | Site: HIP | Status: FUNCTIONAL

## 2019-08-28 DEVICE — IMPLANTABLE DEVICE: Type: IMPLANTABLE DEVICE | Site: HIP | Status: FUNCTIONAL

## 2019-08-28 DEVICE — IMP APEX HOLE ELIMINATOR HIP DEPUY DURALOC 1246-03-000: Type: IMPLANTABLE DEVICE | Site: HIP | Status: FUNCTIONAL

## 2019-08-28 DEVICE — IMP SCR BONE CAN ACE 6.5X20MM 1217-20-500: Type: IMPLANTABLE DEVICE | Site: HIP | Status: FUNCTIONAL

## 2019-08-28 DEVICE — IMP CUP ACE PINNACLE 52MM 1217-22-052: Type: IMPLANTABLE DEVICE | Site: HIP | Status: FUNCTIONAL

## 2019-08-28 RX ORDER — ONDANSETRON 4 MG/1
4 TABLET, ORALLY DISINTEGRATING ORAL EVERY 6 HOURS PRN
Status: DISCONTINUED | OUTPATIENT
Start: 2019-08-28 | End: 2019-08-29 | Stop reason: HOSPADM

## 2019-08-28 RX ORDER — MEPERIDINE HYDROCHLORIDE 25 MG/ML
12.5 INJECTION INTRAMUSCULAR; INTRAVENOUS; SUBCUTANEOUS EVERY 5 MIN PRN
Status: DISCONTINUED | OUTPATIENT
Start: 2019-08-28 | End: 2019-08-28 | Stop reason: HOSPADM

## 2019-08-28 RX ORDER — HYDRALAZINE HYDROCHLORIDE 20 MG/ML
2.5-5 INJECTION INTRAMUSCULAR; INTRAVENOUS EVERY 10 MIN PRN
Status: DISCONTINUED | OUTPATIENT
Start: 2019-08-28 | End: 2019-08-28 | Stop reason: HOSPADM

## 2019-08-28 RX ORDER — ALBUTEROL SULFATE 90 UG/1
2 AEROSOL, METERED RESPIRATORY (INHALATION) EVERY 6 HOURS PRN
Status: DISCONTINUED | OUTPATIENT
Start: 2019-08-28 | End: 2019-08-29 | Stop reason: HOSPADM

## 2019-08-28 RX ORDER — FLUTICASONE PROPIONATE 50 MCG
1 SPRAY, SUSPENSION (ML) NASAL DAILY
Status: DISCONTINUED | OUTPATIENT
Start: 2019-08-29 | End: 2019-08-29 | Stop reason: HOSPADM

## 2019-08-28 RX ORDER — AMOXICILLIN 250 MG
1 CAPSULE ORAL 2 TIMES DAILY
Status: DISCONTINUED | OUTPATIENT
Start: 2019-08-28 | End: 2019-08-29 | Stop reason: HOSPADM

## 2019-08-28 RX ORDER — ALBUMIN, HUMAN INJ 5% 5 %
SOLUTION INTRAVENOUS CONTINUOUS PRN
Status: DISCONTINUED | OUTPATIENT
Start: 2019-08-28 | End: 2019-08-28

## 2019-08-28 RX ORDER — HYDROXYZINE HYDROCHLORIDE 25 MG/1
25 TABLET, FILM COATED ORAL EVERY 6 HOURS PRN
Qty: 30 TABLET | Refills: 0 | Status: ON HOLD | OUTPATIENT
Start: 2019-08-28 | End: 2019-12-12

## 2019-08-28 RX ORDER — NEOSTIGMINE METHYLSULFATE 1 MG/ML
VIAL (ML) INJECTION PRN
Status: DISCONTINUED | OUTPATIENT
Start: 2019-08-28 | End: 2019-08-28

## 2019-08-28 RX ORDER — ONDANSETRON 2 MG/ML
4 INJECTION INTRAMUSCULAR; INTRAVENOUS EVERY 6 HOURS PRN
Status: DISCONTINUED | OUTPATIENT
Start: 2019-08-28 | End: 2019-08-29 | Stop reason: HOSPADM

## 2019-08-28 RX ORDER — LOSARTAN POTASSIUM AND HYDROCHLOROTHIAZIDE 25; 100 MG/1; MG/1
1 TABLET ORAL DAILY
Status: DISCONTINUED | OUTPATIENT
Start: 2019-08-28 | End: 2019-08-28

## 2019-08-28 RX ORDER — ONDANSETRON 4 MG/1
4 TABLET, ORALLY DISINTEGRATING ORAL EVERY 30 MIN PRN
Status: DISCONTINUED | OUTPATIENT
Start: 2019-08-28 | End: 2019-08-28 | Stop reason: HOSPADM

## 2019-08-28 RX ORDER — DULOXETIN HYDROCHLORIDE 60 MG/1
60 CAPSULE, DELAYED RELEASE ORAL DAILY
Status: DISCONTINUED | OUTPATIENT
Start: 2019-08-29 | End: 2019-08-29 | Stop reason: HOSPADM

## 2019-08-28 RX ORDER — CEFAZOLIN SODIUM 2 G/100ML
2 INJECTION, SOLUTION INTRAVENOUS EVERY 8 HOURS
Status: COMPLETED | OUTPATIENT
Start: 2019-08-28 | End: 2019-08-29

## 2019-08-28 RX ORDER — ONDANSETRON 2 MG/ML
4 INJECTION INTRAMUSCULAR; INTRAVENOUS EVERY 30 MIN PRN
Status: DISCONTINUED | OUTPATIENT
Start: 2019-08-28 | End: 2019-08-28 | Stop reason: HOSPADM

## 2019-08-28 RX ORDER — NICOTINE POLACRILEX 4 MG
15-30 LOZENGE BUCCAL
Status: DISCONTINUED | OUTPATIENT
Start: 2019-08-28 | End: 2019-08-29 | Stop reason: HOSPADM

## 2019-08-28 RX ORDER — OXYCODONE HYDROCHLORIDE 5 MG/1
5-10 TABLET ORAL EVERY 4 HOURS PRN
Qty: 30 TABLET | Refills: 0 | Status: ON HOLD | OUTPATIENT
Start: 2019-08-28 | End: 2019-12-12

## 2019-08-28 RX ORDER — SODIUM CHLORIDE, SODIUM LACTATE, POTASSIUM CHLORIDE, CALCIUM CHLORIDE 600; 310; 30; 20 MG/100ML; MG/100ML; MG/100ML; MG/100ML
INJECTION, SOLUTION INTRAVENOUS CONTINUOUS
Status: DISCONTINUED | OUTPATIENT
Start: 2019-08-28 | End: 2019-08-29 | Stop reason: HOSPADM

## 2019-08-28 RX ORDER — ATORVASTATIN CALCIUM 20 MG/1
20 TABLET, FILM COATED ORAL DAILY
Status: DISCONTINUED | OUTPATIENT
Start: 2019-08-29 | End: 2019-08-29 | Stop reason: HOSPADM

## 2019-08-28 RX ORDER — AMLODIPINE BESYLATE 5 MG/1
5 TABLET ORAL DAILY
Status: DISCONTINUED | OUTPATIENT
Start: 2019-08-29 | End: 2019-08-29 | Stop reason: HOSPADM

## 2019-08-28 RX ORDER — DEXAMETHASONE SODIUM PHOSPHATE 4 MG/ML
4 INJECTION, SOLUTION INTRA-ARTICULAR; INTRALESIONAL; INTRAMUSCULAR; INTRAVENOUS; SOFT TISSUE
Status: DISCONTINUED | OUTPATIENT
Start: 2019-08-28 | End: 2019-08-28 | Stop reason: HOSPADM

## 2019-08-28 RX ORDER — SODIUM CHLORIDE, SODIUM LACTATE, POTASSIUM CHLORIDE, CALCIUM CHLORIDE 600; 310; 30; 20 MG/100ML; MG/100ML; MG/100ML; MG/100ML
INJECTION, SOLUTION INTRAVENOUS CONTINUOUS
Status: DISCONTINUED | OUTPATIENT
Start: 2019-08-28 | End: 2019-08-28 | Stop reason: HOSPADM

## 2019-08-28 RX ORDER — HYDROXYZINE HYDROCHLORIDE 25 MG/1
25 TABLET, FILM COATED ORAL EVERY 6 HOURS PRN
Status: DISCONTINUED | OUTPATIENT
Start: 2019-08-28 | End: 2019-08-29 | Stop reason: HOSPADM

## 2019-08-28 RX ORDER — CEFAZOLIN SODIUM 2 G/100ML
2 INJECTION, SOLUTION INTRAVENOUS
Status: COMPLETED | OUTPATIENT
Start: 2019-08-28 | End: 2019-08-28

## 2019-08-28 RX ORDER — NALOXONE HYDROCHLORIDE 0.4 MG/ML
.1-.4 INJECTION, SOLUTION INTRAMUSCULAR; INTRAVENOUS; SUBCUTANEOUS
Status: DISCONTINUED | OUTPATIENT
Start: 2019-08-28 | End: 2019-08-29 | Stop reason: HOSPADM

## 2019-08-28 RX ORDER — FENTANYL CITRATE 50 UG/ML
25-50 INJECTION, SOLUTION INTRAMUSCULAR; INTRAVENOUS
Status: DISCONTINUED | OUTPATIENT
Start: 2019-08-28 | End: 2019-08-28 | Stop reason: HOSPADM

## 2019-08-28 RX ORDER — LABETALOL HYDROCHLORIDE 5 MG/ML
10 INJECTION, SOLUTION INTRAVENOUS
Status: DISCONTINUED | OUTPATIENT
Start: 2019-08-28 | End: 2019-08-28 | Stop reason: HOSPADM

## 2019-08-28 RX ORDER — KETOROLAC TROMETHAMINE 15 MG/ML
15 INJECTION, SOLUTION INTRAMUSCULAR; INTRAVENOUS EVERY 6 HOURS PRN
Status: DISCONTINUED | OUTPATIENT
Start: 2019-08-28 | End: 2019-08-29 | Stop reason: HOSPADM

## 2019-08-28 RX ORDER — ATORVASTATIN CALCIUM 20 MG/1
20 TABLET, FILM COATED ORAL DAILY
COMMUNITY

## 2019-08-28 RX ORDER — HYDROMORPHONE HYDROCHLORIDE 1 MG/ML
.3-.5 INJECTION, SOLUTION INTRAMUSCULAR; INTRAVENOUS; SUBCUTANEOUS EVERY 5 MIN PRN
Status: DISCONTINUED | OUTPATIENT
Start: 2019-08-28 | End: 2019-08-28 | Stop reason: HOSPADM

## 2019-08-28 RX ORDER — NALOXONE HYDROCHLORIDE 0.4 MG/ML
.1-.4 INJECTION, SOLUTION INTRAMUSCULAR; INTRAVENOUS; SUBCUTANEOUS
Status: DISCONTINUED | OUTPATIENT
Start: 2019-08-28 | End: 2019-08-28

## 2019-08-28 RX ORDER — ASPIRIN 325 MG
325 TABLET, DELAYED RELEASE (ENTERIC COATED) ORAL DAILY
Qty: 30 TABLET | Refills: 0 | Status: ON HOLD | OUTPATIENT
Start: 2019-08-29 | End: 2019-12-12

## 2019-08-28 RX ORDER — PROPOFOL 10 MG/ML
INJECTION, EMULSION INTRAVENOUS PRN
Status: DISCONTINUED | OUTPATIENT
Start: 2019-08-28 | End: 2019-08-28

## 2019-08-28 RX ORDER — DEXTROSE MONOHYDRATE 25 G/50ML
25-50 INJECTION, SOLUTION INTRAVENOUS
Status: DISCONTINUED | OUTPATIENT
Start: 2019-08-28 | End: 2019-08-29 | Stop reason: HOSPADM

## 2019-08-28 RX ORDER — AMOXICILLIN 250 MG
1 CAPSULE ORAL 2 TIMES DAILY
Qty: 30 TABLET | Refills: 0 | Status: ON HOLD | OUTPATIENT
Start: 2019-08-29 | End: 2019-12-12

## 2019-08-28 RX ORDER — HYDROMORPHONE HYDROCHLORIDE 1 MG/ML
.3-.5 INJECTION, SOLUTION INTRAMUSCULAR; INTRAVENOUS; SUBCUTANEOUS
Status: DISCONTINUED | OUTPATIENT
Start: 2019-08-28 | End: 2019-08-29 | Stop reason: HOSPADM

## 2019-08-28 RX ORDER — ONDANSETRON 4 MG/1
4 TABLET, ORALLY DISINTEGRATING ORAL EVERY 6 HOURS PRN
Qty: 30 TABLET | Refills: 0 | Status: ON HOLD | OUTPATIENT
Start: 2019-08-28 | End: 2019-12-12

## 2019-08-28 RX ORDER — AMOXICILLIN 250 MG
2 CAPSULE ORAL 2 TIMES DAILY
Status: DISCONTINUED | OUTPATIENT
Start: 2019-08-28 | End: 2019-08-29 | Stop reason: HOSPADM

## 2019-08-28 RX ORDER — ACETAMINOPHEN 325 MG/1
975 TABLET ORAL EVERY 8 HOURS
Status: DISCONTINUED | OUTPATIENT
Start: 2019-08-28 | End: 2019-08-29 | Stop reason: HOSPADM

## 2019-08-28 RX ORDER — GLYCOPYRROLATE 0.2 MG/ML
INJECTION, SOLUTION INTRAMUSCULAR; INTRAVENOUS PRN
Status: DISCONTINUED | OUTPATIENT
Start: 2019-08-28 | End: 2019-08-28

## 2019-08-28 RX ORDER — LIDOCAINE 40 MG/G
CREAM TOPICAL
Status: DISCONTINUED | OUTPATIENT
Start: 2019-08-28 | End: 2019-08-29 | Stop reason: HOSPADM

## 2019-08-28 RX ORDER — CEFAZOLIN SODIUM 1 G/3ML
1 INJECTION, POWDER, FOR SOLUTION INTRAMUSCULAR; INTRAVENOUS SEE ADMIN INSTRUCTIONS
Status: DISCONTINUED | OUTPATIENT
Start: 2019-08-28 | End: 2019-08-28 | Stop reason: HOSPADM

## 2019-08-28 RX ORDER — POLYETHYLENE GLYCOL 3350 17 G/17G
17 POWDER, FOR SOLUTION ORAL DAILY PRN
Status: DISCONTINUED | OUTPATIENT
Start: 2019-08-28 | End: 2019-08-29 | Stop reason: HOSPADM

## 2019-08-28 RX ORDER — FENTANYL CITRATE 50 UG/ML
INJECTION, SOLUTION INTRAMUSCULAR; INTRAVENOUS PRN
Status: DISCONTINUED | OUTPATIENT
Start: 2019-08-28 | End: 2019-08-28

## 2019-08-28 RX ORDER — EPHEDRINE SULFATE 50 MG/ML
INJECTION, SOLUTION INTRAMUSCULAR; INTRAVENOUS; SUBCUTANEOUS PRN
Status: DISCONTINUED | OUTPATIENT
Start: 2019-08-28 | End: 2019-08-28

## 2019-08-28 RX ORDER — DEXAMETHASONE SODIUM PHOSPHATE 4 MG/ML
INJECTION, SOLUTION INTRA-ARTICULAR; INTRALESIONAL; INTRAMUSCULAR; INTRAVENOUS; SOFT TISSUE PRN
Status: DISCONTINUED | OUTPATIENT
Start: 2019-08-28 | End: 2019-08-28

## 2019-08-28 RX ORDER — OXYCODONE HYDROCHLORIDE 5 MG/1
5-10 TABLET ORAL
Status: DISCONTINUED | OUTPATIENT
Start: 2019-08-28 | End: 2019-08-29 | Stop reason: HOSPADM

## 2019-08-28 RX ORDER — ACETAMINOPHEN 325 MG/1
650 TABLET ORAL EVERY 4 HOURS PRN
Status: DISCONTINUED | OUTPATIENT
Start: 2019-08-31 | End: 2019-08-29 | Stop reason: HOSPADM

## 2019-08-28 RX ORDER — MONTELUKAST SODIUM 10 MG/1
10 TABLET ORAL DAILY
Status: DISCONTINUED | OUTPATIENT
Start: 2019-08-29 | End: 2019-08-29 | Stop reason: HOSPADM

## 2019-08-28 RX ORDER — ONDANSETRON 2 MG/ML
INJECTION INTRAMUSCULAR; INTRAVENOUS PRN
Status: DISCONTINUED | OUTPATIENT
Start: 2019-08-28 | End: 2019-08-28

## 2019-08-28 RX ORDER — LIDOCAINE HYDROCHLORIDE 20 MG/ML
INJECTION, SOLUTION INFILTRATION; PERINEURAL PRN
Status: DISCONTINUED | OUTPATIENT
Start: 2019-08-28 | End: 2019-08-28

## 2019-08-28 RX ORDER — MAGNESIUM HYDROXIDE 1200 MG/15ML
LIQUID ORAL PRN
Status: DISCONTINUED | OUTPATIENT
Start: 2019-08-28 | End: 2019-08-28 | Stop reason: HOSPADM

## 2019-08-28 RX ADMIN — LIDOCAINE HYDROCHLORIDE 0.3 ML: 10 INJECTION, SOLUTION EPIDURAL; INFILTRATION; INTRACAUDAL; PERINEURAL at 10:12

## 2019-08-28 RX ADMIN — PHENYLEPHRINE HYDROCHLORIDE 200 MCG: 10 INJECTION INTRAVENOUS at 12:07

## 2019-08-28 RX ADMIN — PHENYLEPHRINE HYDROCHLORIDE 200 MCG: 10 INJECTION INTRAVENOUS at 12:49

## 2019-08-28 RX ADMIN — ASPIRIN 325 MG: 325 TABLET, DELAYED RELEASE ORAL at 17:37

## 2019-08-28 RX ADMIN — GLYCOPYRROLATE 0.8 MG: 0.2 INJECTION, SOLUTION INTRAMUSCULAR; INTRAVENOUS at 12:51

## 2019-08-28 RX ADMIN — ROCURONIUM BROMIDE 50 MG: 10 INJECTION INTRAVENOUS at 10:43

## 2019-08-28 RX ADMIN — NEOSTIGMINE METHYLSULFATE 4 MG: 1 INJECTION, SOLUTION INTRAVENOUS at 12:51

## 2019-08-28 RX ADMIN — PROPOFOL 200 MG: 10 INJECTION, EMULSION INTRAVENOUS at 10:43

## 2019-08-28 RX ADMIN — OXYCODONE HYDROCHLORIDE 5 MG: 5 TABLET ORAL at 20:31

## 2019-08-28 RX ADMIN — HYDROMORPHONE HYDROCHLORIDE 0.5 MG: 1 INJECTION, SOLUTION INTRAMUSCULAR; INTRAVENOUS; SUBCUTANEOUS at 11:47

## 2019-08-28 RX ADMIN — CEFAZOLIN SODIUM 2 G: 2 INJECTION, SOLUTION INTRAVENOUS at 20:20

## 2019-08-28 RX ADMIN — CEFAZOLIN 1 G: 1 INJECTION, POWDER, FOR SOLUTION INTRAMUSCULAR; INTRAVENOUS at 12:40

## 2019-08-28 RX ADMIN — DEXAMETHASONE SODIUM PHOSPHATE 4 MG: 4 INJECTION, SOLUTION INTRA-ARTICULAR; INTRALESIONAL; INTRAMUSCULAR; INTRAVENOUS; SOFT TISSUE at 10:58

## 2019-08-28 RX ADMIN — FENTANYL CITRATE 25 MCG: 50 INJECTION, SOLUTION INTRAMUSCULAR; INTRAVENOUS at 10:43

## 2019-08-28 RX ADMIN — SODIUM CHLORIDE, POTASSIUM CHLORIDE, SODIUM LACTATE AND CALCIUM CHLORIDE: 600; 310; 30; 20 INJECTION, SOLUTION INTRAVENOUS at 10:15

## 2019-08-28 RX ADMIN — Medication 5 MG: at 12:01

## 2019-08-28 RX ADMIN — HYDROMORPHONE HYDROCHLORIDE 0.5 MG: 1 INJECTION, SOLUTION INTRAMUSCULAR; INTRAVENOUS; SUBCUTANEOUS at 19:39

## 2019-08-28 RX ADMIN — FENTANYL CITRATE 25 MCG: 50 INJECTION, SOLUTION INTRAMUSCULAR; INTRAVENOUS at 13:09

## 2019-08-28 RX ADMIN — DEXMEDETOMIDINE HYDROCHLORIDE 20 MCG: 100 INJECTION, SOLUTION INTRAVENOUS at 11:11

## 2019-08-28 RX ADMIN — PHENYLEPHRINE HYDROCHLORIDE 200 MCG: 10 INJECTION INTRAVENOUS at 12:38

## 2019-08-28 RX ADMIN — ACETAMINOPHEN 975 MG: 325 TABLET ORAL at 21:51

## 2019-08-28 RX ADMIN — LIDOCAINE HYDROCHLORIDE 100 MG: 20 INJECTION, SOLUTION INFILTRATION; PERINEURAL at 10:43

## 2019-08-28 RX ADMIN — SODIUM CHLORIDE, POTASSIUM CHLORIDE, SODIUM LACTATE AND CALCIUM CHLORIDE: 600; 310; 30; 20 INJECTION, SOLUTION INTRAVENOUS at 11:49

## 2019-08-28 RX ADMIN — ALBUMIN HUMAN: 0.05 INJECTION, SOLUTION INTRAVENOUS at 12:38

## 2019-08-28 RX ADMIN — SODIUM CHLORIDE 1 G: 9 INJECTION, SOLUTION INTRAVENOUS at 11:04

## 2019-08-28 RX ADMIN — SODIUM CHLORIDE, POTASSIUM CHLORIDE, SODIUM LACTATE AND CALCIUM CHLORIDE: 600; 310; 30; 20 INJECTION, SOLUTION INTRAVENOUS at 15:53

## 2019-08-28 RX ADMIN — Medication 10 MG: at 12:35

## 2019-08-28 RX ADMIN — ONDANSETRON 4 MG: 2 INJECTION INTRAMUSCULAR; INTRAVENOUS at 12:34

## 2019-08-28 RX ADMIN — PHENYLEPHRINE HYDROCHLORIDE 100 MCG: 10 INJECTION INTRAVENOUS at 12:31

## 2019-08-28 RX ADMIN — HYDROMORPHONE HYDROCHLORIDE 0.5 MG: 1 INJECTION, SOLUTION INTRAMUSCULAR; INTRAVENOUS; SUBCUTANEOUS at 15:58

## 2019-08-28 RX ADMIN — PHENYLEPHRINE HYDROCHLORIDE 200 MCG: 10 INJECTION INTRAVENOUS at 12:06

## 2019-08-28 RX ADMIN — HYDROMORPHONE HYDROCHLORIDE 0.5 MG: 1 INJECTION, SOLUTION INTRAMUSCULAR; INTRAVENOUS; SUBCUTANEOUS at 11:35

## 2019-08-28 RX ADMIN — PHENYLEPHRINE HYDROCHLORIDE 100 MCG: 10 INJECTION INTRAVENOUS at 11:19

## 2019-08-28 RX ADMIN — CEFAZOLIN SODIUM 2 G: 2 INJECTION, SOLUTION INTRAVENOUS at 10:55

## 2019-08-28 RX ADMIN — SODIUM CHLORIDE 1 G: 9 INJECTION, SOLUTION INTRAVENOUS at 12:37

## 2019-08-28 RX ADMIN — FENTANYL CITRATE 50 MCG: 50 INJECTION, SOLUTION INTRAMUSCULAR; INTRAVENOUS at 11:11

## 2019-08-28 RX ADMIN — PHENYLEPHRINE HYDROCHLORIDE 100 MCG: 10 INJECTION INTRAVENOUS at 10:58

## 2019-08-28 RX ADMIN — DEXMEDETOMIDINE HYDROCHLORIDE 0.4 MCG/KG/HR: 100 INJECTION, SOLUTION INTRAVENOUS at 11:09

## 2019-08-28 RX ADMIN — FENTANYL CITRATE 25 MCG: 50 INJECTION INTRAMUSCULAR; INTRAVENOUS at 13:52

## 2019-08-28 RX ADMIN — PHENYLEPHRINE HYDROCHLORIDE 200 MCG: 10 INJECTION INTRAVENOUS at 12:20

## 2019-08-28 RX ADMIN — PHENYLEPHRINE HYDROCHLORIDE 100 MCG: 10 INJECTION INTRAVENOUS at 11:51

## 2019-08-28 RX ADMIN — KETOROLAC TROMETHAMINE 15 MG: 15 INJECTION, SOLUTION INTRAMUSCULAR; INTRAVENOUS at 17:38

## 2019-08-28 RX ADMIN — MIDAZOLAM 2 MG: 1 INJECTION INTRAMUSCULAR; INTRAVENOUS at 10:36

## 2019-08-28 RX ADMIN — HYDROMORPHONE HYDROCHLORIDE 0.5 MG: 1 INJECTION, SOLUTION INTRAMUSCULAR; INTRAVENOUS; SUBCUTANEOUS at 13:16

## 2019-08-28 RX ADMIN — FENTANYL CITRATE 50 MCG: 50 INJECTION INTRAMUSCULAR; INTRAVENOUS at 13:35

## 2019-08-28 RX ADMIN — SENNOSIDES AND DOCUSATE SODIUM 1 TABLET: 8.6; 5 TABLET ORAL at 20:31

## 2019-08-28 ASSESSMENT — MIFFLIN-ST. JEOR: SCORE: 1630.02

## 2019-08-28 ASSESSMENT — ACTIVITIES OF DAILY LIVING (ADL)
ADLS_ACUITY_SCORE: 11
ADLS_ACUITY_SCORE: 11

## 2019-08-28 ASSESSMENT — LIFESTYLE VARIABLES: TOBACCO_USE: 1

## 2019-08-28 NOTE — ANESTHESIA CARE TRANSFER NOTE
Patient: Liborio Noguera    Procedure(s):  RIGHT TOTAL HIP REPLACEMENT    Diagnosis: DJD OF RIGHT HIP  Diagnosis Additional Information: No value filed.    Anesthesia Type:   General, ETT     Note:  Airway :Face Mask  Patient transferred to:PACU  Comments: Spontaneously breathing with adequate vT, sats 98 - 100%, TOF 4/4 with sustained tetany. Purposeful movement, following commands with 100% O2 at 15 L/min, suctioned x2, Anesthesiologist present.  Extubated.  To PACU with O2 via SFM at 10 L/minute, VSS. Monitors on, report to RN.Handoff Report: Identifed the Patient, Identified the Reponsible Provider, Reviewed the pertinent medical history, Discussed the surgical course, Reviewed Intra-OP anesthesia mangement and issues during anesthesia, Set expectations for post-procedure period and Allowed opportunity for questions and acknowledgement of understanding      Vitals: (Last set prior to Anesthesia Care Transfer)    CRNA VITALS  8/28/2019 1237 - 8/28/2019 1314      8/28/2019             NIBP:  103/80    NIBP Mean:  86    Resp Rate (set):  10                Electronically Signed By: JOEL Donaldson CRNA  August 28, 2019  1:14 PM

## 2019-08-28 NOTE — CONSULTS
Consult Date:  08/28/2019      REQUESTING PROVIDER:  Lis Montez MD.       REASON FOR CONSULTATION:  Hospitalist consult.      HISTORY OF PRESENT ILLNESS:  Cheikh Noguera is a 58-year-old gentleman with a past medical history significant for type 2 diabetes mellitus, hypertension, hyperlipidemia, depression, anxiety, spinal stenosis with prior intervention, history of alcohol abuse and asthma who is postoperative day 0, status post right total hip arthroplasty for osteoarthritis.  The procedure was performed by Dr. Montez under general anesthesia with an estimated blood loss less than 10 mL.  No intraoperative complications have been identified.  Hospitalist Service was consulted for postoperative medical co-management.  The patient is presently evaluated in his room on the orthopedic floor.  He reports he is doing pretty well after surgery.  Presently rates the pain a 6-7/10.  He denies any nausea or vomiting, is tolerating clear liquids thus far.  He denies any chest pain, shortness of breath, wheezing.  He is not dizzy.  He is moving all 4 extremities without difficulty and denies sensation changes.  He took most of his medications prior to the procedure this morning.      REVIEW OF SYSTEMS:  A 10-point review of systems was conducted and is negative aside from the information in the HPI.      PAST MEDICAL HISTORY:   1.  Type 2 diabetes mellitus.  On metformin prior to admission.  Last hemoglobin A1c from 04/2019 was 5.4.   2.  Hypertension.   3.  Dyslipidemia.   4.  Depression and anxiety.   5.  Spinal stenosis, status post L4 through L5 microdiskectomy and laminectomy in 05/2019.   6.  History of alcohol abuse, currently still drinks alcohol but reports he has cut back significantly.   7.  Asthma, denies recent ED visits or hospitalizations.  Denies recent steroid use.      PAST SURGICAL HISTORY:    Past Surgical History:   Procedure Laterality Date     ARTHROPLASTY HIP Right 8/28/2019    Procedure: RIGHT  TOTAL HIP REPLACEMENT;  Surgeon: Lis Montez MD;  Location: [facility] OR     COLONOSCOPY       ENT SURGERY      tonsillectomy     HEMILAMINECTOMY, DISCECTOMY LUMBAR ONE LEVEL, COMBINED Bilateral 5/15/2019    Procedure: LUMBAR 4-5 LAMINECTOMY/DISCECTOMY LUMBAR;  Surgeon: Reza Ya MD;  Location: [facility] OR     HERNIA REPAIR  2008     ORTHOPEDIC SURGERY Right 2008    knee scope     parathyroid exploration Left      testicle removal Right      vitiligo           ALLERGIES:  ACE INHIBITORS.        PRIOR TO ADMISSION MEDICATIONS:    Prior to Admission medications    Medication Sig Last Dose Taking? Auth Provider   acetaminophen (TYLENOL) 500 MG tablet Take 1,000 mg by mouth 2 times daily as needed for mild pain  8/27/2019 at prn Yes Reported, Patient   albuterol (VENTOLIN HFA) 108 (90 Base) MCG/ACT inhaler Inhale 2 puffs into the lungs every 6 hours as needed  8/27/2019 at am Yes Reported, Patient   amLODIPine (NORVASC) 5 MG tablet Take 5 mg by mouth daily  8/28/2019 at 0600 Yes Reported, Patient   atorvastatin (LIPITOR) 20 MG tablet Take 20 mg by mouth daily 8/28/2019 at 0600 Yes Reported, Patient   DULoxetine (CYMBALTA) 60 MG capsule Take 60 mg by mouth daily  8/28/2019 at 0600 Yes Reported, Patient   fluticasone (FLONASE) 50 MCG/ACT nasal spray Spray 1 spray in nostril daily  8/28/2019 at 0600 Yes Reported, Patient   fluticasone-salmeterol (ADVAIR DISKUS) 250-50 MCG/DOSE inhaler Inhale 1 puff into the lungs 2 times daily  8/28/2019 at 0600 Yes Reported, Patient   losartan-hydrochlorothiazide (HYZAAR) 100-25 MG tablet Take 1 tablet by mouth daily  8/28/2019 at 0600 Yes Reported, Patient   metFORMIN (GLUCOPHAGE) 500 MG tablet Take 500 mg by mouth daily  8/27/2019 at 1200 Yes Reported, Patient   montelukast (SINGULAIR) 10 MG tablet Take 10 mg by mouth daily  8/28/2019 at 0600 Yes Reported, Patient   sertraline (ZOLOFT) 50 MG tablet Take 50 mg by mouth daily  8/28/2019 at 0600 Yes Reported, Patient           SOCIAL  HISTORY:  The patient denies drug use or smoking history.  He does drink alcohol 3-4 days a week, 2-3 drinks at a time.  Reports a history of heavier use.  He reports he had one beer on the evening prior to admission.  Denies history of withdrawal.      FAMILY HISTORY:  Reviewed and noncontributory.      LABORATORY DATA:  Preoperative creatinine 1.11, K 3.4.  Blood sugars today have ranged from 105-128.      PHYSICAL EXAMINATION:   VITAL SIGNS:  Temperature 98 degrees, heart rate 75, blood pressure 126/78, respiratory rate 11, oxygen saturation is 97% on 2 liters nasal cannula.   GENERAL:  Alert and oriented gentleman, sitting up in bed, appears comfortable, appropriately conversant.   HEENT:  Pupils equal and reactive to light, EOMI.   ENT:  Mucous membranes are moist.   CARDIOVASCULAR:  Regular rate and rhythm, no murmurs appreciated.   RESPIRATORY:  Lungs are clear to auscultation bilaterally, no increased work of breathing.  No wheezing appreciated.   GASTROINTESTINAL:  Hypoactive bowel sounds.  Abdomen is soft and nontender to palpation.   SKIN:  Warm and dry.   EXTREMITIES:  Moves all 4 extremities.  PCDs are in place.   NEUROLOGIC:  Cranial nerves II-XII are grossly intact.  No focal deficits.  Speech is clear.  Face symmetric.      ASSESSMENT:  Mr. Liborio Noguera is a very pleasant 58-year-old gentleman with a history of hypertension, hyperlipidemia, type 2 diabetes, depression, anxiety, spinal stenosis, asthma and history of alcohol abuse who is postoperative day 0, status post right total hip arthroplasty for osteoarthritis.  The procedure was performed by Dr. Montez under general anesthesia with an estimated blood loss of less than 10 mL.  No intraoperative complications have been identified.  Hospitalist Service consulted for postoperative medical co-management.   1.  Postoperative day 0, status post right total hip arthroplasty for osteoarthritis.  Procedure was performed under general anesthesia  with an estimated blood loss less than 10 mL.  The patient is hemodynamically stable and pain currently controlled.     -- Primary management is per Orthopedic Service including DVT prophylaxis and pain control.   -- Full dose aspirin scheduled to begin later this evening for DVT prophylaxis.   -- Perioperative Ancef.   -- LR at 75 mL per hour, this should be discontinued when patient tolerating adequate p.o. intake.   -- BMP, hemoglobin for the a.m.   2.  Type 2 diabetes mellitus, non-insulin dependent.  Last hemoglobin A1c in 2019 was 5.4.  He is on metformin prior to admission.     -- Hold prior to admission metformin.   -- Q.i.d. blood sugar checks with medium sliding scale insulin as needed.   3.  Hypertension.  BP controlled postoperatively.   -- Hold prior to admission losartan/hydrochlorothiazide, can resume tomorrow pending stable BMP and blood pressure.   -- Continue prior to admission Norvasc with hold parameters.   4.  Asthma.  Denies recent exacerbation or steroid use.  Lungs are clear at this time.   -- We will continue prior to admission AdvSundar goldenir.   -- P.r.n. albuterol is available.   5.  Depression and anxiety.   -- Continue prior to admission sertraline.   6.  Dyslipidemia.  Continue prior to admission statin.      The patient was seen and examined with Dr. Koko Bravo who agrees with the above plan.        Hospitalist Service will continue to follow along.  We appreciate the consultation.         KOKO BRAVO DO       As dictated by MONICA DRAPER PA-C            D: 2019   T: 2019   MT: WT      Name:     RIN COOK   MRN:      [id number]        Account:       [id number]   :      1960           Consult Date:  2019      Document: [id number]       cc: Lis Montez MD

## 2019-08-28 NOTE — BRIEF OP NOTE
[facility]    Brief Operative Note    Pre-operative diagnosis: DJD OF RIGHT HIP  Post-operative diagnosis * No post-op diagnosis entered *  Procedure: Procedure(s):  RIGHT TOTAL HIP REPLACEMENT  Surgeon: Surgeon(s) and Role:     * Lis Montez MD - Primary     * Camacho Carlisle PA-C - Assisting  Anesthesia: General   Estimated blood loss: Less than 10 ml  Drains: None  Specimens: * No specimens in log *  Findings:   None.  Complications: None.  Implants:    Implant Name Type Inv. Item Serial No.  Lot No. LRB No. Used   IMP CUP ACE PINNACLE 52MM 1217- Total Joint Component/Insert IMP CUP ACE PINNACLE 52MM 1217-  &amp;[facility] C[facility]-C [id number] Right 1   IMP APEX HOLE ELIMINATOR HIP DEPUY DURALOC 1246- Metallic Hardware/[employer] IMP APEX HOLE ELIMINATOR HIP DEPUY DURALOC 1246-  [employer] [facility] CARE [facility]-C [id number] Right 1   IMP SCR BONE CAN ACE 6.5X25MM 1217- Metallic Hardware/[employer] IMP SCR BONE CAN ACE 6.5X25MM 1217-  &amp;[facility] [id number] Right 1   IMP SCR BONE CAN ACE 6.5X20MM 1217- Metallic Hardware/[employer] IMP SCR BONE CAN ACE 6.5X20MM 1217-  [employer]amp[employer] [facility] CARE [facility]-C [id number] Right 1   PINNACLE ALTRX POLYETHYLENE ACETABULAR LINER    Depuy [id number] Right 1   IMP HEAD FEMORAL DEPUY CERAMIC 36MM +8MM 1365- Total Joint Component/Insert IMP HEAD FEMORAL DEPUY CERAMIC 36MM +8MM 1365-   [id number] Right 1   IMP STEM FEM GRIPTION HI-OFFSET PROX SZ5 [id number] Total Joint Component/Insert IMP STEM FEM GRIPTION HI-OFFSET PROX SZ5 [id number]  J[employer]amp;[employer] [facility]-   [id number] Right 1

## 2019-08-28 NOTE — PROGRESS NOTES
Admission medication history interview status for the 8/28/2019  admission is complete. See EPIC admission navigator for prior to admission medications     Medication history source reliability:Good    Medication history interview source(s):Patient    Medication history resources (including written lists, pill bottles, clinic record):emailed in    [facility]    Additional medication history information not noted on PTA med list :  Patient brought own:  Albuterol Inhaler  Advair inhaler  Flonase nasal spray    Time spent in this activity: 35 minutes    Prior to Admission medications    Medication Sig Last Dose Taking? Auth Provider   acetaminophen (TYLENOL) 500 MG tablet Take 1,000 mg by mouth 2 times daily as needed for mild pain  8/27/2019 at prn Yes Reported, Patient   albuterol (VENTOLIN HFA) 108 (90 Base) MCG/ACT inhaler Inhale 2 puffs into the lungs every 6 hours as needed  8/27/2019 at am Yes Reported, Patient   amLODIPine (NORVASC) 5 MG tablet Take 5 mg by mouth daily  8/28/2019 at 0600 Yes Reported, Patient   atorvastatin (LIPITOR) 20 MG tablet Take 20 mg by mouth daily 8/28/2019 at 0600 Yes Reported, Patient   DULoxetine (CYMBALTA) 60 MG capsule Take 60 mg by mouth daily  8/28/2019 at 0600 Yes Reported, Patient   fluticasone (FLONASE) 50 MCG/ACT nasal spray Spray 1 spray in nostril daily  8/28/2019 at 0600 Yes Reported, Patient   fluticasone-salmeterol (ADVAIR DISKUS) 250-50 MCG/DOSE inhaler Inhale 1 puff into the lungs 2 times daily  8/28/2019 at 0600 Yes Reported, Patient   losartan-hydrochlorothiazide (HYZAAR) 100-25 MG tablet Take 1 tablet by mouth daily  8/28/2019 at 0600 Yes Reported, Patient   metFORMIN (GLUCOPHAGE) 500 MG tablet Take 500 mg by mouth daily  8/27/2019 at 1200 Yes Reported, Patient   montelukast (SINGULAIR) 10 MG tablet Take 10 mg by mouth daily  8/28/2019 at 0600 Yes Reported, Patient   sertraline (ZOLOFT) 50 MG tablet Take 50 mg by mouth daily  8/28/2019 at  0600 Yes Reported, Patient

## 2019-08-28 NOTE — ANESTHESIA POSTPROCEDURE EVALUATION
Patient: Liborio Noguera    Procedure(s):  RIGHT TOTAL HIP REPLACEMENT    Diagnosis:DJD OF RIGHT HIP  Diagnosis Additional Information: No value filed.    Anesthesia Type:  General, ETT    Note:  Anesthesia Post Evaluation    Patient location during evaluation: PACU  Patient participation: Able to fully participate in evaluation  Level of consciousness: awake and alert  Pain management: adequate  Airway patency: patent  Cardiovascular status: acceptable  Respiratory status: acceptable and unassisted  Hydration status: acceptable  PONV: none             Last vitals:  Vitals:    08/28/19 1310 08/28/19 1316 08/28/19 1320   BP: 121/79  109/79   Pulse: 102  89   Resp: 12 14 10   Temp:   36.2  C (97.2  F)   SpO2: 98%  100%         Electronically Signed By: Deshaun Oliver MD  August 28, 2019  1:27 PM

## 2019-08-28 NOTE — PLAN OF CARE
Pt arrived to floor from PACU 1430. A&O. VSS on 2L O2. Capno WNL. Up with PT for therapy with A1 GB and walker. Pain managed with IV dilaudid. CMS intact ex baseline numbness right foot and bilat hands intermittently from spinal stenosis. Dressing CDI. Tolerated clear liquid diet, advanced to regular. . Cantu with AUO. IVF infusing.

## 2019-08-28 NOTE — OP NOTE
Procedure Date: 08/28/2019      PREOPERATIVE DIAGNOSIS:  Osteoarthritis of the right hip.      POSTOPERATIVE DIAGNOSIS:  Osteoarthritis of the right hip.      PROCEDURE PERFORMED:  Right total hip arthroplasty using the DePuy size 5 standard offset Tri-Lock stem and a size 52 mm Cordova cup with a +4 neutral liner for a 52 mm cup, 36 mm head and a +5, 36 mm head ceramic.      INDICATIONS FOR PROCEDURE:  The patient is a 58-year-old male with a history of a painful right hip with diagnosis of osteoarthritis of the right hip for several years.  His pain has become incapacitating.  He had difficulty getting in and out of a car, and all of his activities of daily living.  I saw the patient in consultation at which time I discussed treatment options with him.  I explained to him the risks, benefits, potential complications of total hip arthroplasty.  This discussion included, but was not specific to infection, vascular neurologic complications, DVT, leg length inequality, instability of the hip, fracture, septic and aseptic loosening, arthrofibrosis, heterotopic ossification, and possible need for further revision surgery.  After this discussion, the patient wanted to proceed with surgery.      ANESTHESIA:  General.      ASSISTANT:  Cornelio Carlisle PA-C.      DESCRIPTION OF PROCEDURE:  The patient was taken to the operating room and placed on the operative table in supine position.  After adequate induction of a general anesthetic, he was transferred to the lateral position and held this way with Spear hip renae, held with his right hip up.  Axillary roll was placed.  Care was taken to pad all bony prominences.  The patient was given 2 grams of Ancef for infection prophylaxis given 1 hour prior to incision.  We then performed sterile prep and drape of the right hip and right lower extremity.  After sterile prep and drape, the decision was made to proceed with an anterolateral approach to the hip.  I identified the  abductors, took down the anterior one-third of the abductors, tagged and retracted medially for later repair.  We then did a T capsulotomy, preserving the capsule for later repair.  We were then able to dislocate the hip.  Upon dislocation of the hip, we made appropriate femoral neck cut and applied bone wax to cut surface of femur to diminish blood loss.  We then placed retractors anteriorly, superiorly and posteriorly to expose the acetabulum.  We then reamed to 51 mm and eventually put in a 52 mm cup.  Prior to placing the cup, we took reamings of the femoral head to use as bone graft.  Once the cup was fully seated, we then applied two screws for additional fixation.  We then applied a manhole cover.  We then put in the +4 neutral liner for a 52 mm cup and a 36 mm head.  We then directed our attention to the femur, used a broach-only technique and broached to a size 5 stem, reduced the hip and found it to be very stable in full extension, external rotation, flexion, adduction, internal rotation with restoration of leg length and offset.  We then obtained intraoperative AP pelvis x-ray which demonstrated ideal position of the cup and screws with a good fit on the femoral side with restoration of leg length and offset.  We then dislocated the hip, removed the trial components, irrigated with pulse jet lavage and put down the actual stem.  Once the stem was fully seated, we then applied the head, reduced the hip again found it to be very stable in full extension, external rotation, flexion, adduction, internal rotation with restoration of leg length and offset.  We then soaked the hip in a dilute solution of Betadine for 3 minutes and irrigated using pulse jet lavage.  We used approximately 3 liters of antibiotic saline in pulsatile jet lavage.  We then repaired the capsule using 0 Ethibond.  We then repaired the abductors using #2 Ethibond.  The fascial layer was closed using 0 Ethibond and was oversewn using 0  Stratafix.  Deep subcutaneous was closed using 0 Vicryl, superficial subcutaneous was closed using 2-0 Vicryl and skin was closed with a running 3-0 Monocryl subcuticular stitch followed by Prineo dressing.  Sterile dressing was applied followed by abduction pillow.  The patient tolerated the operative procedure.  There were no intraoperative complications.  The patient went to PAR in stable condition.  Plan will be for the patient to get 24 hours of Ancef for infection prophylaxis and 30 days of aspirin for DVT prophylaxis.         BEATRIZ CARPIO MD             D: 2019   T: 2019   MT: TIFFANY      Name:     RIN COOK   MRN:      [id number]        Account:        [id number]   :      1960           Procedure Date: 2019      Document: [id number]

## 2019-08-28 NOTE — PROGRESS NOTES
08/28/19 1600   Quick Adds   Type of Visit Initial PT Evaluation   Living Environment   Lives With spouse   Living Arrangements house   Home Accessibility stairs to enter home;stairs within home   Number of Stairs, Main Entrance 3   Stair Railings, Main Entrance railings on both sides of stairs   Number of Stairs, Within Home, Primary 10   Stair Railings, Within Home, Primary railings safe and in good condition   Transportation Anticipated family or friend will provide   Living Environment Comment Pt lives with spouse in a 2SH with bed and bathroom on the mainfloor. Pt has 3 steps to enter home with B rail support.   Self-Care   Usual Activity Tolerance good   Current Activity Tolerance moderate   Regular Exercise No   Equipment Currently Used at Home none   Activity/Exercise/Self-Care Comment Pt was ind with all ADL's and IADL's   Functional Level Prior   Ambulation 0-->independent   Transferring 0-->independent   Toileting 0-->independent   Bathing 0-->independent   Communication 0-->understands/communicates without difficulty   Swallowing 0-->swallows foods/liquids without difficulty   Cognition 0 - no cognition issues reported   Fall history within last six months no   Which of the above functional risks had a recent onset or change? none   Prior Functional Level Comment Pt was ind with ambualtion. Pt owns a RW at home.   General Information   Onset of Illness/Injury or Date of Surgery - Date 08/28/19   Referring Physician Lis Montez MD   Patient/Family Goals Statement go home   Pertinent History of Current Problem (include personal factors and/or comorbidities that impact the POC) Pt is a 58 yr old male admitted for R CAMILLA anterior -lateral approach, POD 1   Precautions/Limitations fall precautions   Weight-Bearing Status - LLE full weight-bearing   Weight-Bearing Status - RLE weight-bearing as tolerated   General Observations Anterior-Lateral Hip precautions.    General Info Comments Activity:  Ambualte with assist, Up in chair, Hip abduction pillow    Cognitive Status Examination   Orientation orientation to person, place and time   Level of Consciousness alert   Follows Commands and Answers Questions 100% of the time   Personal Safety and Judgment intact   Memory intact   Pain Assessment   Patient Currently in Pain Yes, see Vital Sign flowsheet  (6/10 pain reproted. RN provided with IV pain meds piror toPT)   Range of Motion (ROM)   ROM Comment R hip: NT, otherwise WFL   Strength   Strength Comments R hip: NT, otherwise: 4/5   Bed Mobility   Bed Mobility Comments Supine>sit: Min assist x 1, Sit>supine: CGA   Transfer Skills   Transfer Comments STS at CGA and verbal cues   Gait   Gait Comments 20 ft using RW and CGA   Balance   Balance Comments Sitting: Good   Sensory Examination   Sensory Perception no deficits were identified   Coordination   Coordination no deficits were identified   Muscle Tone   Muscle Tone no deficits were identified   Modality Interventions   Planned Modality Interventions Cryotherapy   General Therapy Interventions   Planned Therapy Interventions balance training;bed mobility training;gait training;strengthening;transfer training;risk factor education;home program guidelines;progressive activity/exercise   Clinical Impression   Criteria for Skilled Therapeutic Intervention yes, treatment indicated   PT Diagnosis Impaired gait   Influenced by the following impairments decreased strength, decreased activity toelrance   Functional limitations due to impairments assistance needed wit hmobility   Clinical Presentation Stable/Uncomplicated   Clinical Presentation Rationale clinical judgement   Clinical Decision Making (Complexity) Low complexity   Therapy Frequency 2x/day   Predicted Duration of Therapy Intervention (days/wks) 3   Anticipated Discharge Disposition Home with Assist   Risk & Benefits of therapy have been explained Yes   Patient, Family & other staff in agreement with plan  "of care Yes   Clinical Impression Comments Pt presents with decreased strength, activity tolerance and increased pain limiting fucntional mobiltiy. Pt will benefit from continued skilled PT to achieve PLOF and independence.    [facility] AM-PAC  \"6 Clicks\" V.2 Basic Mobility Inpatient Short Form   1. Turning from your back to your side while in a flat bed without using bedrails? 3 - A Little   2. Moving from lying on your back to sitting on the side of a flat bed without using bedrails? 3 - A Little   3. Moving to and from a bed to a chair (including a wheelchair)? 3 - A Little   4. Standing up from a chair using your arms (e.g., wheelchair, or bedside chair)? 3 - A Little   5. To walk in hospital room? 3 - A Little   6. Climbing 3-5 steps with a railing? 3 - A Little   Basic Mobility Raw Score (Score out of 24.Lower scores equate to lower levels of function) 18   Total Evaluation Time   Total Evaluation Time (Minutes) 15     "

## 2019-08-28 NOTE — PLAN OF CARE
Discharge Planner PT   Patient plan for discharge: Home with assist of spouse.   Current status: PT eval completed, treatment initiated. Pt is a 58 yr old admitted for R CAMILLA, anterior-lateral approach, POD 0. Pt lives with spouse in a 2SH with 3 AIRAM with B rail support. Pt's bed and bathroom is on the main floor. Pt was ind at baseline.   Pt is educated on anterior-lateral hip precautions. Pt is at min assist x 1 with supine>sit, CGA with sit>supine. Pt stood with CGA for safety and  verbal cues. Pt ambulated 150 ft using RW and CGA for safety.   Barriers to return to prior living situation: Steps to enter home (not assessed yet), otherwise none anticipated.   Recommendations for discharge: Home with assist of spouse.   Rationale for recommendations: Pt is expected to make progress in all functional mobility during his inpatient stay. Pt is anticipated to need assist of spouse with all mobility until independence is achieved.        Entered by: Ricardo Clay 08/28/2019 4:30 PM

## 2019-08-28 NOTE — ANESTHESIA PREPROCEDURE EVALUATION
Anesthesia Pre-Procedure Evaluation    Patient: Liborio Noguera   MRN: [id number] : 1960          Preoperative Diagnosis: DJD OF RIGHT HIP    Procedure(s):  TOTAL RIGHT HIP ARTHROPLASTY (DEPUY)^    Past Medical History:   Diagnosis Date     Alcohol abuse      Allergic rhinitis      Anxiety and depression      Asthma      Diabetes (H)      Gout      HTN (hypertension)      Hyperlipidemia      Hyperparathyroidism (H)      Spinal stenosis      Vitiligo      Past Surgical History:   Procedure Laterality Date     COLONOSCOPY       ENT SURGERY      tonsillectomy     HEMILAMINECTOMY, DISCECTOMY LUMBAR ONE LEVEL, COMBINED Bilateral 5/15/2019    Procedure: LUMBAR 4-5 LAMINECTOMY/DISCECTOMY LUMBAR;  Surgeon: Reza Ya MD;  Location: [facility] OR     HERNIA REPAIR  2008     ORTHOPEDIC SURGERY Right 2008    knee scope     parathyroid exploration Left      testicle removal Right      vitiligo         Anesthesia Evaluation     . Pt has had prior anesthetic. Type: General    No history of anesthetic complications          ROS/MED HX    ENT/Pulmonary:     (+)allergic rhinitis, tobacco use, Current use asthma Treatment: Inhaler prn,  , . .   (-) sleep apnea   Neurologic: Comment: R foot numbness since laminectomy    (+)neuropathy     Cardiovascular:     (+) Dyslipidemia, hypertension----. : . . . :. . No previous cardiac testing       METS/Exercise Tolerance:  4 - Raking leaves, gardening   Hematologic:  - neg hematologic  ROS       Musculoskeletal:   (+) arthritis,  other musculoskeletal- chronic lumbar back pain      GI/Hepatic:  - neg GI/hepatic ROS      (-) GERD   Renal/Genitourinary:  - ROS Renal section negative       Endo:     (+) type II DM Not using insulin - not using insulin pump Normal glucose range: low 100's Other Endocrine Disorder hyperparathyroidism.      Psychiatric:     (+) psychiatric history depression      Infectious Disease:  - neg infectious disease ROS       Malignancy:      - no malignancy  "  Other: Comment: H/O Alcohol abuse   (+) H/O Chronic Pain,                        Physical Exam  Normal systems: cardiovascular, pulmonary and dental    Airway   Mallampati: II  TM distance: >3 FB  Neck ROM: full    Dental     Cardiovascular       Pulmonary             Lab Results   Component Value Date    POTASSIUM 4.0 04/03/2019    CR 1.18 04/03/2019     (H) 04/03/2019       Preop Vitals  BP Readings from Last 3 Encounters:   08/15/19 126/72   06/24/19 92/64   05/15/19 106/74    Pulse Readings from Last 3 Encounters:   08/15/19 70   06/24/19 79   05/15/19 86      Resp Readings from Last 3 Encounters:   08/15/19 17   05/15/19 20    SpO2 Readings from Last 3 Encounters:   08/15/19 97%   05/15/19 95%      Temp Readings from Last 1 Encounters:   08/15/19 36.9  C (98.4  F) (Temporal)    Ht Readings from Last 1 Encounters:   08/15/19 1.676 m (5' 6\")      Wt Readings from Last 1 Encounters:   08/15/19 87.5 kg (192 lb 14.4 oz)    Estimated body mass index is 31.13 kg/m  as calculated from the following:    Height as of 8/15/19: 1.676 m (5' 6\").    Weight as of 8/15/19: 87.5 kg (192 lb 14.4 oz).       Anesthesia Plan      History & Physical Review  History and physical reviewed and following examination; no interval change.    ASA Status:  3 .    NPO Status:  > 8 hours    Plan for General and ETT with Intravenous and Propofol induction. Maintenance will be Balanced.    PONV prophylaxis:  Ondansetron (or other 5HT-3)  Additional equipment: Videolaryngoscope      Postoperative Care  Postoperative pain management:  Multi-modal analgesia.      Consents  Anesthetic plan, risks, benefits and alternatives discussed with:  Patient..                 Deshaun Oliver MD  "

## 2019-08-29 ENCOUNTER — APPOINTMENT (OUTPATIENT)
Dept: PHYSICAL THERAPY | Facility: CLINIC | Age: 59
DRG: 470 | End: 2019-08-29
Attending: ORTHOPAEDIC SURGERY
Payer: COMMERCIAL

## 2019-08-29 ENCOUNTER — APPOINTMENT (OUTPATIENT)
Dept: OCCUPATIONAL THERAPY | Facility: CLINIC | Age: 59
DRG: 470 | End: 2019-08-29
Attending: ORTHOPAEDIC SURGERY
Payer: COMMERCIAL

## 2019-08-29 VITALS
WEIGHT: 192.3 LBS | HEART RATE: 66 BPM | SYSTOLIC BLOOD PRESSURE: 116 MMHG | DIASTOLIC BLOOD PRESSURE: 75 MMHG | HEIGHT: 66 IN | RESPIRATION RATE: 16 BRPM | BODY MASS INDEX: 30.91 KG/M2 | OXYGEN SATURATION: 95 % | TEMPERATURE: 98.2 F

## 2019-08-29 LAB
ANION GAP SERPL CALCULATED.3IONS-SCNC: 6 MMOL/L (ref 3–14)
BUN SERPL-MCNC: 18 MG/DL (ref 7–30)
CALCIUM SERPL-MCNC: 8.3 MG/DL (ref 8.5–10.1)
CHLORIDE SERPL-SCNC: 105 MMOL/L (ref 94–109)
CO2 SERPL-SCNC: 27 MMOL/L (ref 20–32)
CREAT SERPL-MCNC: 1.01 MG/DL (ref 0.66–1.25)
GFR SERPL CREATININE-BSD FRML MDRD: 81 ML/MIN/{1.73_M2}
GLUCOSE BLDC GLUCOMTR-MCNC: 111 MG/DL (ref 70–99)
GLUCOSE BLDC GLUCOMTR-MCNC: 162 MG/DL (ref 70–99)
GLUCOSE SERPL-MCNC: 125 MG/DL (ref 70–99)
HBA1C MFR BLD: 5.6 % (ref 0–5.6)
HGB BLD-MCNC: 9.2 G/DL (ref 13.3–17.7)
POTASSIUM SERPL-SCNC: 3.6 MMOL/L (ref 3.4–5.3)
SODIUM SERPL-SCNC: 138 MMOL/L (ref 133–144)

## 2019-08-29 PROCEDURE — 25000128 H RX IP 250 OP 636: Performed by: ORTHOPAEDIC SURGERY

## 2019-08-29 PROCEDURE — 00000146 ZZHCL STATISTIC GLUCOSE BY METER IP

## 2019-08-29 PROCEDURE — 97530 THERAPEUTIC ACTIVITIES: CPT | Mod: GO | Performed by: OCCUPATIONAL THERAPIST

## 2019-08-29 PROCEDURE — 25000132 ZZH RX MED GY IP 250 OP 250 PS 637: Performed by: ORTHOPAEDIC SURGERY

## 2019-08-29 PROCEDURE — 25000132 ZZH RX MED GY IP 250 OP 250 PS 637

## 2019-08-29 PROCEDURE — 97166 OT EVAL MOD COMPLEX 45 MIN: CPT | Mod: GO | Performed by: OCCUPATIONAL THERAPIST

## 2019-08-29 PROCEDURE — 97535 SELF CARE MNGMENT TRAINING: CPT | Mod: GO | Performed by: OCCUPATIONAL THERAPIST

## 2019-08-29 PROCEDURE — 80048 BASIC METABOLIC PNL TOTAL CA: CPT | Performed by: ORTHOPAEDIC SURGERY

## 2019-08-29 PROCEDURE — 97110 THERAPEUTIC EXERCISES: CPT | Mod: GP | Performed by: PHYSICAL THERAPIST

## 2019-08-29 PROCEDURE — 97530 THERAPEUTIC ACTIVITIES: CPT | Mod: GP | Performed by: PHYSICAL THERAPIST

## 2019-08-29 PROCEDURE — 83036 HEMOGLOBIN GLYCOSYLATED A1C: CPT | Performed by: ORTHOPAEDIC SURGERY

## 2019-08-29 PROCEDURE — 25000132 ZZH RX MED GY IP 250 OP 250 PS 637: Performed by: PHYSICIAN ASSISTANT

## 2019-08-29 PROCEDURE — 36415 COLL VENOUS BLD VENIPUNCTURE: CPT | Performed by: ORTHOPAEDIC SURGERY

## 2019-08-29 PROCEDURE — 97116 GAIT TRAINING THERAPY: CPT | Mod: GP | Performed by: PHYSICAL THERAPIST

## 2019-08-29 PROCEDURE — 85018 HEMOGLOBIN: CPT | Performed by: ORTHOPAEDIC SURGERY

## 2019-08-29 RX ADMIN — CEFAZOLIN SODIUM 2 G: 2 INJECTION, SOLUTION INTRAVENOUS at 03:49

## 2019-08-29 RX ADMIN — OXYCODONE HYDROCHLORIDE 5 MG: 5 TABLET ORAL at 00:25

## 2019-08-29 RX ADMIN — MONTELUKAST 10 MG: 10 TABLET, FILM COATED ORAL at 10:35

## 2019-08-29 RX ADMIN — DULOXETINE HYDROCHLORIDE 60 MG: 60 CAPSULE, DELAYED RELEASE ORAL at 08:16

## 2019-08-29 RX ADMIN — ASPIRIN 325 MG: 325 TABLET, DELAYED RELEASE ORAL at 08:16

## 2019-08-29 RX ADMIN — SERTRALINE HYDROCHLORIDE 50 MG: 50 TABLET ORAL at 08:16

## 2019-08-29 RX ADMIN — OXYCODONE HYDROCHLORIDE 10 MG: 5 TABLET ORAL at 09:35

## 2019-08-29 RX ADMIN — OXYCODONE HYDROCHLORIDE 5 MG: 5 TABLET ORAL at 13:23

## 2019-08-29 RX ADMIN — ATORVASTATIN CALCIUM 20 MG: 20 TABLET, FILM COATED ORAL at 08:16

## 2019-08-29 RX ADMIN — AMLODIPINE BESYLATE 5 MG: 5 TABLET ORAL at 08:16

## 2019-08-29 RX ADMIN — OXYCODONE HYDROCHLORIDE 5 MG: 5 TABLET ORAL at 03:48

## 2019-08-29 RX ADMIN — FLUTICASONE FUROATE AND VILANTEROL TRIFENATATE 1 PUFF: 200; 25 POWDER RESPIRATORY (INHALATION) at 08:21

## 2019-08-29 RX ADMIN — SENNOSIDES AND DOCUSATE SODIUM 2 TABLET: 8.6; 5 TABLET ORAL at 08:16

## 2019-08-29 RX ADMIN — ACETAMINOPHEN 975 MG: 325 TABLET ORAL at 06:33

## 2019-08-29 RX ADMIN — OXYCODONE HYDROCHLORIDE 5 MG: 5 TABLET ORAL at 06:33

## 2019-08-29 ASSESSMENT — ACTIVITIES OF DAILY LIVING (ADL)
PREVIOUS_RESPONSIBILITIES: MEAL PREP;HOUSEKEEPING;LAUNDRY;SHOPPING;YARDWORK;MEDICATION MANAGEMENT;FINANCES;DRIVING;WORK
ADLS_ACUITY_SCORE: 11
ADLS_ACUITY_SCORE: 11
ADLS_ACUITY_SCORE: 13
IADL_COMMENTS: SPOUSE CAN ASSIST AS NEEDED
ADLS_ACUITY_SCORE: 11

## 2019-08-29 NOTE — PLAN OF CARE
A&O x4, VSS on RA, CMS intact, Drng CDI, up with assist of 1, Voiding adequately, Pain controlled w/ oxycodone, tolerating reg diet, went over discharge instruction and medication with patient. All questions answered. Continue to monitor till discharge.

## 2019-08-29 NOTE — DISCHARGE SUMMARY
Orthopedic Discharge Summary   Patient: Liborio Noguera  Admission Date: 8/28/2019  Discharge Date: 8/28/19  Date of Service: 8/28/2019  Attending Provider: Lis Montez MD  Admission Diagnosis: DJD OF RIGHT HIP  H/O total hip arthroplasty  Discharge Diagnosis: right hip osteoarthritis  Procedures Performed: right total hip replacement  Complications: None apparent   History of Present Illness: see operative report for full HPI  Past Medical History:   Past Medical History:   Diagnosis Date     Alcohol abuse      Allergic rhinitis      Anxiety and depression      Asthma      Diabetes (H)      Gout      HTN (hypertension)      Hyperlipidemia      Hyperparathyroidism (H)      Spinal stenosis      Vitiligo      Patient Active Problem List   Diagnosis     H/O total hip arthroplasty     Past Surgical History:   Procedure Laterality Date     COLONOSCOPY       ENT SURGERY      tonsillectomy     HEMILAMINECTOMY, DISCECTOMY LUMBAR ONE LEVEL, COMBINED Bilateral 5/15/2019    Procedure: LUMBAR 4-5 LAMINECTOMY/DISCECTOMY LUMBAR;  Surgeon: Reza Ya MD;  Location: [facility]     HERNIA REPAIR  2008     ORTHOPEDIC SURGERY Right 2008    knee scope     parathyroid exploration Left      testicle removal Right      vitiligo       Social History     Socioeconomic History     Marital status:      Spouse name: Not on file     Number of children: Not on file     Years of education: Not on file     Highest education level: Not on file   Occupational History     Not on file   Social Needs     Financial resource strain: Not on file     Food insecurity:     Worry: Not on file     Inability: Not on file     Transportation needs:     Medical: Not on file     Non-medical: Not on file   Tobacco Use     Smoking status: Never Smoker     Smokeless tobacco: Never Used   Substance and Sexual Activity     Alcohol use: Yes     Comment: 1 can of beer a week     Drug use: Not on file     Sexual activity: Not on file   Lifestyle      Physical activity:     Days per week: Not on file     Minutes per session: Not on file     Stress: Not on file   Relationships     Social connections:     Talks on phone: Not on file     Gets together: Not on file     Attends Buddhist service: Not on file     Active member of club or organization: Not on file     Attends meetings of clubs or organizations: Not on file     Relationship status: Not on file     Intimate partner violence:     Fear of current or ex partner: Not on file     Emotionally abused: Not on file     Physically abused: Not on file     Forced sexual activity: Not on file   Other Topics Concern     Parent/sibling w/ CABG, MI or angioplasty before 65F 55M? Not Asked   Social History Narrative     Not on file     Medications on admission:   Medications Prior to Admission   Medication Sig Dispense Refill Last Dose     acetaminophen (TYLENOL) 500 MG tablet Take 1,000 mg by mouth 2 times daily as needed for mild pain    8/27/2019 at prn     albuterol (VENTOLIN HFA) 108 (90 Base) MCG/ACT inhaler Inhale 2 puffs into the lungs every 6 hours as needed    8/28/2019 at 0945     amLODIPine (NORVASC) 5 MG tablet Take 5 mg by mouth daily    8/28/2019 at 0600     atorvastatin (LIPITOR) 20 MG tablet Take 20 mg by mouth daily   8/28/2019 at 0600     DULoxetine (CYMBALTA) 60 MG capsule Take 60 mg by mouth daily   3 8/28/2019 at 0600     fluticasone (FLONASE) 50 MCG/ACT nasal spray Spray 1 spray in nostril daily    8/28/2019 at 0600     fluticasone-salmeterol (ADVAIR DISKUS) 250-50 MCG/DOSE inhaler Inhale 1 puff into the lungs 2 times daily    8/28/2019 at 0600     losartan-hydrochlorothiazide (HYZAAR) 100-25 MG tablet Take 1 tablet by mouth daily   3 8/28/2019 at 0600     metFORMIN (GLUCOPHAGE) 500 MG tablet Take 500 mg by mouth daily    8/27/2019 at 1200     montelukast (SINGULAIR) 10 MG tablet Take 10 mg by mouth daily    8/28/2019 at 0600     sertraline (ZOLOFT) 50 MG tablet Take 50 mg by mouth daily     8/28/2019 at 0600     Allergies:    Allergies   Allergen Reactions     Ace Inhibitors Cough       Hospital Course: Patient was admitted to Orthopedics and brought to the OR on where he underwent the above named procedure. Postoperatively he did very well with no apparent complications, and he had an uneventful hospital stay. Ancef was given for 24 hours after surgery. He was given aspirin for DVT prophylaxis and his pain was well controlled with oral abx, then transitioned to oral pain medications during his hospital stay. He progressed well with physical therapy and discharge to home was recommended. His chronic medical problems were followed by the medicine team during his hospital stay and there were no significant changes to conditions or treatment plans. No new medical problems presented during his hospital stay.   Consultations Obtained During Hospitalization:  1. Internal medicine for management of comorbid conditions and home medication management.  2. Physical Therapy for gait training, mobilization, range of motion and strengthening exercises  3. Occupational Therapy for activities of daily living.  4. Social Work for disposition planning.  Active Problems:    H/O total hip arthroplasty    Discharge Disposition: patient improving  Discharge Diet: resume normal pre op diet  Discharge Medications:   Current Discharge Medication List      START taking these medications    Details   aspirin (ASA) 325 MG EC tablet Take 1 tablet (325 mg) by mouth daily  Qty: 30 tablet, Refills: 0    Associated Diagnoses: History of total right hip replacement      hydrOXYzine (ATARAX) 25 MG tablet Take 1 tablet (25 mg) by mouth every 6 hours as needed for itching  Qty: 30 tablet, Refills: 0    Associated Diagnoses: History of total right hip replacement      ondansetron (ZOFRAN-ODT) 4 MG ODT tab Take 1 tablet (4 mg) by mouth every 6 hours as needed for nausea or vomiting  Qty: 30 tablet, Refills: 0    Associated Diagnoses:  History of total right hip replacement      oxyCODONE (ROXICODONE) 5 MG tablet Take 1-2 tablets (5-10 mg) by mouth every 4 hours as needed for breakthrough pain  Qty: 30 tablet, Refills: 0    Associated Diagnoses: History of total right hip replacement      senna-docusate (SENOKOT-S/PERICOLACE) 8.6-50 MG tablet Take 1 tablet by mouth 2 times daily  Qty: 30 tablet, Refills: 0    Associated Diagnoses: History of total right hip replacement         CONTINUE these medications which have NOT CHANGED    Details   acetaminophen (TYLENOL) 500 MG tablet Take 1,000 mg by mouth 2 times daily as needed for mild pain       albuterol (VENTOLIN HFA) 108 (90 Base) MCG/ACT inhaler Inhale 2 puffs into the lungs every 6 hours as needed       amLODIPine (NORVASC) 5 MG tablet Take 5 mg by mouth daily       atorvastatin (LIPITOR) 20 MG tablet Take 20 mg by mouth daily      DULoxetine (CYMBALTA) 60 MG capsule Take 60 mg by mouth daily   Refills: 3      fluticasone (FLONASE) 50 MCG/ACT nasal spray Spray 1 spray in nostril daily       fluticasone-salmeterol (ADVAIR DISKUS) 250-50 MCG/DOSE inhaler Inhale 1 puff into the lungs 2 times daily       losartan-hydrochlorothiazide (HYZAAR) 100-25 MG tablet Take 1 tablet by mouth daily   Refills: 3      metFORMIN (GLUCOPHAGE) 500 MG tablet Take 500 mg by mouth daily       montelukast (SINGULAIR) 10 MG tablet Take 10 mg by mouth daily       sertraline (ZOLOFT) 50 MG tablet Take 50 mg by mouth daily            Code Status:   X-rays needed at the follow up visit:   Camacho Carlisle PA-C  8/28/2019 10:37 PM   [facility]  Shreya  [phone]

## 2019-08-29 NOTE — PLAN OF CARE
Discharge Planner PT   Patient plan for discharge: Home today    Current status: Pt rates pain 4/10 after having OT, agreeable to PT. Pt transfers supine>sit with SBA, sit>supine with SBA and pt able to self-assist RLE into bed to avoid active abduction. Pt transfers sit<>stand to FWW with SBA. Pt ambulated 200' with FWW and SBA. Pt able to ascend/descend 3 stairs x2, first with B rails then with single rail and SEC as pt can't reach both rails simultaneously at home. Pt has FWW and SEC. Pt participated in R CAMILLA exercises and tolerated well, HEP handout provided.     Barriers to return to prior living situation: None anticipated    Recommendations for discharge: Home with supervision from spouse and SEC on stairs, use of FWW for ambulation, CAMILLA HEP    Rationale for recommendations: Pt moving well on POD1, all PT goals met, no further needs identified. Anticipate safe disch to home with spouse. Pt has all AD needs met at home. PT orders completed.       Entered by: Ana Senior 08/29/2019 10:20 AM     Physical Therapy Discharge Summary    Reason for therapy discharge:    All goals and outcomes met, no further needs identified.    Progress towards therapy goal(s). See goals on Care Plan in [facility] electronic health record for goal details.  Goals met    Therapy recommendation(s):    Continue home exercise program.

## 2019-08-29 NOTE — PROGRESS NOTES
"Liborio Noguera  2019  POD # 1 sp tka    Admit Date:  2019      Doing well.  Normal healing wound.  No immediate surgical complications identified.  No excessive bleeding  Pain well-controlled.  Tolerating physical therapy and rehabilitation well.  Objective:  Blood pressure 103/63, pulse 69, temperature 98.3  F (36.8  C), temperature source Oral, resp. rate 16, height 1.676 m (5' 6\"), weight 87.2 kg (192 lb 4.8 oz), SpO2 95 %.    Temperatures:  Current - Temp: 98.3  F (36.8  C); Max - Temp  Av.7  F (36.5  C)  Min: 97.2  F (36.2  C)  Max: 98.6  F (37  C)  Pulse range: Pulse  Av.6  Min: 69  Max: 102  Blood pressure range: Systolic (24hrs), Av , Min:100 , Max:126   ; Diastolic (24hrs), Av, Min:63, Max:79    Exam:  CMS: intact  alert, stable, wound ok  Calf nontender b le.       Labs:  Recent Labs   Lab Test 19  0948 19   POTASSIUM 3.4 4.0     No results for input(s): HGB in the last 35498 hours.  No results for input(s): INR in the last 22673 hours.  No results for input(s): PLT in the last 05352 hours.    PLAN: Weight bearing as tolerated  Continue physical therapy  Pain control measures  Discharge to home today.       "

## 2019-08-29 NOTE — PLAN OF CARE
A&OX4. VSS on RA. Capno WNL. Numbness present to R three middle toes and BUE, baseline per pt. Dressing CDI. Taking oxycodone for pain. Up w/ A1. Pepe removed this am. Plan to possibly discharge today.

## 2019-08-29 NOTE — PLAN OF CARE
Discharge Planner OT   Patient plan for discharge: home, hopefully today  Current status: evaluation completed, treatment initiated.  Patient lives with wife in a house with main level living.  Patient was educated in hip precautions, LE dressing with AE, toilet transfer, shower transfer/task, walker safety and transport of objects, and patient completed all with SBA/Raj.  Patient plans to purchase AE independently.  Supine <> sit and ambulation in room CGA/VC with 2WW. Pt needing VC for hand placement for transfers.  Barriers to return to prior living situation: no OT barriers  Recommendations for discharge: discharge to home with wife to assist with home chores, and SBA for shower transfer/walker management  Rationale for recommendations: patient progressing well, anticipate he will be able to discharge to home with wife to assist.       Entered by: JACKSON NESS 08/29/2019 9:07 AM          Occupational Therapy Discharge Summary    Reason for therapy discharge:    All goals and outcomes met, no further needs identified.    Progress towards therapy goal(s). See goals on Care Plan in [facility] electronic health record for goal details.  Goals met    Therapy recommendation(s):    No further therapy is recommended.

## 2019-08-29 NOTE — PROGRESS NOTES
08/29/19 0800   Quick Adds   Type of Visit Initial Occupational Therapy Evaluation   Living Environment   Lives With spouse   Living Arrangements house   Home Accessibility stairs to enter home;stairs within home   Number of Stairs, Main Entrance 3   Number of Stairs, Within Home, Primary 10   Transportation Anticipated family or friend will provide   Living Environment Comment Pt lives with spouse in a 2SH with bed and bathroom on the mainfloor. Pt has 3 steps to enter home with B rail support.   Self-Care   Usual Activity Tolerance good   Current Activity Tolerance moderate   Regular Exercise No   Equipment Currently Used at Home none   Activity/Exercise/Self-Care Comment Pt indep with ambulation at baseline   Functional Level   Ambulation 0-->independent   Transferring 0-->independent   Toileting 0-->independent   Bathing 0-->independent   Dressing 0-->independent   Eating 0-->independent   Communication 0-->understands/communicates without difficulty   Swallowing 0-->swallows foods/liquids without difficulty   Cognition 0 - no cognition issues reported   Fall history within last six months no   Which of the above functional risks had a recent onset or change? none   Prior Functional Level Comment Pt indep with all ADLs at baseline   General Information   Onset of Illness/Injury or Date of Surgery - Date 08/28/19   Referring Physician Lis Montez   Patient/Family Goals Statement home, hopefully today   Additional Occupational Profile Info/Pertinent History of Current Problem Pt is s/p CAMILLA A-L or PL exposure   Precautions/Limitations fall precautions;right hip precautions   Weight-Bearing Status - LUE full weight-bearing   Weight-Bearing Status - RUE full weight-bearing   Weight-Bearing Status - LLE full weight-bearing   Weight-Bearing Status - RLE weight-bearing as tolerated   Heart Disease Risk Factors Lack of physical activity;Gender;Age;Overweight   General Observations Pt supine upon arrival; pt  agreeable to OT session   General Info Comments Activity order: ambulate with assist 3x daily   Cognitive Status Examination   Cognitive Comment No cognitive concerns   Sensory Examination   Sensory Quick Adds No deficits were identified   Pain Assessment   Patient Currently in Pain Yes, see Vital Sign flowsheet   Integumentary/Edema   Integumentary/Edema Comments Typical mild post- op swelling RLE   Posture   Posture forward head position;protracted shoulders   Range of Motion (ROM)   ROM Comment Pt demonstrated functional BUE AROM during bed mobility, dressing task   Strength   Strength Comments Pt demonstrated functional BUE strength during bed mobility, transfers   Mobility   Bed Mobility Bed mobility skill: Scooting/Bridging;Bed mobility skill: Sit to supine;Bed mobility skill: Supine to sit   Bed Mobility Skill: Scooting/Bridging   Level of [place]: Scooting/Bridging contact guard   Physical Assist/Nonphysical Assist: Scooting/Bridging verbal cues   Bed Mobility Skill: Sit to Supine   Level of [place]: Sit/Supine stand-by assist   Physical Assist/Nonphysical Assist: Sit/Supine verbal cues;supervision;set-up required   Bed Mobility Skill: Supine to Sit   Level of [place]: Supine/Sit stand-by assist   Physical Assist/Nonphysical Assist: Supine/Sit verbal cues;supervision;set-up required   Transfer Skill: Sit to Stand   Level of [place]: Sit/Stand stand-by assist   Physical Assist/Nonphysical Assist: Sit/Stand verbal cues;supervision;set-up required   Transfer Skill: Sit to Stand weight-bearing as tolerated   Assistive Device for Transfer: Sit/Stand standard walker   Transfer Skill: Toilet Transfer   Level of [place]: Toilet stand-by assist   Physical Assist/Nonphysical Assist: Toilet verbal cues;supervision;set-up required   Weight-Bearing Restrictions: Toilet weight-bearing as tolerated   Assistive Device standard walker;seat riser;grab bars   Toilet Transfer Skill Comments Pt has  "handicapped toilet at home, with sink on side for support   Tub/Shower Transfer   Tub/Shower Transfer Comments Pt has walk-in shower   Balance   Balance Comments Good seated; good ambulating in room with 2WW   Lower Body Dressing   Level of [place]: Dress Lower Body minimum assist (75% patients effort)   Physical Assist/Nonphysical Assist: Dress Lower Body 1 person assist;verbal cues;supervision;set-up required   Assistive Device reacher;sock-aid;long-handled shoe horn;elastic laces   Instrumental Activities of Daily Living (IADL)   Previous Responsibilities meal prep;housekeeping;laundry;shopping;yardwork;medication management;finances;driving;work   IADL Comments spouse can assist as needed   Activities of Daily Living Analysis   Impairments Contributing to Impaired Activities of Daily Living pain;post surgical precautions;ROM decreased;strength decreased   General Therapy Interventions   Planned Therapy Interventions ADL retraining;transfer training   Clinical Impression   Criteria for Skilled Therapeutic Interventions Met yes, treatment indicated   OT Diagnosis impaired ADLs   Influenced by the following impairments post-op pain, weakness, precautions   Assessment of Occupational Performance 3-5 Performance Deficits   Identified Performance Deficits dressing, bathing, toileting, home chores   Clinical Decision Making (Complexity) Moderate complexity   Therapy Frequency Daily   Predicted Duration of Therapy Intervention (days/wks) 1 day   Anticipated Equipment Needs at Discharge bath sponge;reacher;sock aide   Anticipated Discharge Disposition Home with Assist   Risks and Benefits of Treatment have been explained. Yes   Patient, Family & other staff in agreement with plan of care Yes   [facility] AM-PAC  \"6 Clicks\" Daily Activity Inpatient Short Form   1. Putting on and taking off regular lower body clothing? 4 - None   2. Bathing (including washing, rinsing, drying)? 3 - A Little   3. Toileting, " which includes using toilet, bedpan or urinal? 4 - None   4. Putting on and taking off regular upper body clothing? 4 - None   5. Taking care of personal grooming such as brushing teeth? 4 - None   6. Eating meals? 4 - None   Daily Activity Raw Score (Score out of 24.Lower scores equate to lower levels of function) 23   Total Evaluation Time   Total Evaluation Time (Minutes) 8

## 2019-10-28 ENCOUNTER — TELEPHONE (OUTPATIENT)
Dept: NEUROSURGERY | Facility: CLINIC | Age: 59
End: 2019-10-28

## 2019-10-28 NOTE — TELEPHONE ENCOUNTER
Patient is s/p LUMBAR 4-5 LAMINECTOMY/DISCECTOMY LUMBAR on 05/15/2019 by Dr. Ya. LOV with Yvette Pineda NP on 08/15/2019 for 3 month follow up.  Reports continued pain in his lower back, stiffness in the morning and pain throughout day. Rates pain 4-5/10.  Sometimes radiates down the right leg. Numbness in right foot, same as from before the surgery. On Tylenol prn for pain management.   Has not had Physical Therapy or other interventions post-op  Right total hip replacement on 08/28/2019. Hip pain improved.     Will discuss with care team.

## 2019-10-28 NOTE — TELEPHONE ENCOUNTER
REASON FOR CALL: Pt called stating that he is still in pain and having numbness. He would like a call back from Dr. Ya's team to discuss his current symptoms, recommendations, if he should make an appt, or if new imaging is needed.

## 2019-10-28 NOTE — TELEPHONE ENCOUNTER
Per Katina NP If he is having the same symptoms as prior to surgery and they aren't worsening-would recommend him to continue to monitor at this time. We could initiated PT if he is interested.    Nursing calling back to verify that symptom severity is the same and place order for Physical Therapy. Will encourage him to continue to monitor and call with questions, concerns, or worsening symptoms. Also wanted to verify if he has current restrictions post hip surgery to follow prior to implementing PT.

## 2019-10-29 NOTE — TELEPHONE ENCOUNTER
Spoke with patient went over Yvette RANGEL recommendations of continuing to monitor and starting physical therapy. Patient states the symptoms are the same/similar as before surgery. however they have progressively worsened over the past 3 months.     Patient is requesting a return call from Nima GREENBERG

## 2019-12-12 ENCOUNTER — HOSPITAL ENCOUNTER (INPATIENT)
Facility: CLINIC | Age: 59
LOS: 4 days | Discharge: HOME HEALTH | DRG: 466 | End: 2019-12-16
Attending: ORTHOPAEDIC SURGERY | Admitting: INTERNAL MEDICINE
Payer: COMMERCIAL

## 2019-12-12 ENCOUNTER — HOSPITAL LABORATORY (OUTPATIENT)
Dept: OTHER | Facility: CLINIC | Age: 59
End: 2019-12-12

## 2019-12-12 ENCOUNTER — ANESTHESIA (OUTPATIENT)
Dept: SURGERY | Facility: CLINIC | Age: 59
DRG: 466 | End: 2019-12-12
Payer: COMMERCIAL

## 2019-12-12 ENCOUNTER — ANESTHESIA EVENT (OUTPATIENT)
Dept: SURGERY | Facility: CLINIC | Age: 59
DRG: 466 | End: 2019-12-12
Payer: COMMERCIAL

## 2019-12-12 DIAGNOSIS — Z96.649 INFECTION OF PROSTHETIC TOTAL HIP JOINT, INITIAL ENCOUNTER (H): Primary | ICD-10-CM

## 2019-12-12 DIAGNOSIS — T84.59XA INFECTION OF PROSTHETIC TOTAL HIP JOINT, INITIAL ENCOUNTER (H): Primary | ICD-10-CM

## 2019-12-12 LAB
ABO + RH BLD: NORMAL
ABO + RH BLD: NORMAL
ANION GAP SERPL CALCULATED.3IONS-SCNC: 4 MMOL/L (ref 3–14)
BLD GP AB SCN SERPL QL: NORMAL
BLOOD BANK CMNT PATIENT-IMP: NORMAL
BUN SERPL-MCNC: 27 MG/DL (ref 7–30)
CALCIUM SERPL-MCNC: 8.8 MG/DL (ref 8.5–10.1)
CHLORIDE SERPL-SCNC: 104 MMOL/L (ref 94–109)
CO2 SERPL-SCNC: 24 MMOL/L (ref 20–32)
CREAT SERPL-MCNC: 1.25 MG/DL (ref 0.66–1.25)
CRP SERPL-MCNC: 241 MG/L (ref 0–8)
ERYTHROCYTE [DISTWIDTH] IN BLOOD BY AUTOMATED COUNT: 16 % (ref 10–15)
ERYTHROCYTE [SEDIMENTATION RATE] IN BLOOD BY WESTERGREN METHOD: 57 MM/H (ref 0–20)
GFR SERPL CREATININE-BSD FRML MDRD: 62 ML/MIN/{1.73_M2}
GLUCOSE BLDC GLUCOMTR-MCNC: 109 MG/DL (ref 70–99)
GLUCOSE SERPL-MCNC: 100 MG/DL (ref 70–99)
GRAM STN SPEC: NORMAL
GRAM STN SPEC: NORMAL
HBA1C MFR BLD: 6 % (ref 0–5.6)
HCT VFR BLD AUTO: 31.1 % (ref 40–53)
HGB BLD-MCNC: 10 G/DL (ref 13.3–17.7)
Lab: NORMAL
MCH RBC QN AUTO: 26.1 PG (ref 26.5–33)
MCHC RBC AUTO-ENTMCNC: 32.2 G/DL (ref 31.5–36.5)
MCV RBC AUTO: 81 FL (ref 78–100)
PLATELET # BLD AUTO: 388 10E9/L (ref 150–450)
POTASSIUM SERPL-SCNC: 3.1 MMOL/L (ref 3.4–5.3)
RBC # BLD AUTO: 3.83 10E12/L (ref 4.4–5.9)
SODIUM SERPL-SCNC: 132 MMOL/L (ref 133–144)
SPECIMEN EXP DATE BLD: NORMAL
SPECIMEN SOURCE: NORMAL
WBC # BLD AUTO: 14.2 10E9/L (ref 4–11)

## 2019-12-12 PROCEDURE — 93010 ELECTROCARDIOGRAM REPORT: CPT | Performed by: INTERNAL MEDICINE

## 2019-12-12 PROCEDURE — 86140 C-REACTIVE PROTEIN: CPT | Performed by: PHYSICIAN ASSISTANT

## 2019-12-12 PROCEDURE — 71000012 ZZH RECOVERY PHASE 1 LEVEL 1 FIRST HR: Performed by: ORTHOPAEDIC SURGERY

## 2019-12-12 PROCEDURE — 0KQN0ZZ REPAIR RIGHT HIP MUSCLE, OPEN APPROACH: ICD-10-PCS | Performed by: ORTHOPAEDIC SURGERY

## 2019-12-12 PROCEDURE — 83036 HEMOGLOBIN GLYCOSYLATED A1C: CPT | Performed by: PHYSICIAN ASSISTANT

## 2019-12-12 PROCEDURE — 80048 BASIC METABOLIC PNL TOTAL CA: CPT | Performed by: PHYSICIAN ASSISTANT

## 2019-12-12 PROCEDURE — 85652 RBC SED RATE AUTOMATED: CPT | Performed by: PHYSICIAN ASSISTANT

## 2019-12-12 PROCEDURE — 0SRR03A REPLACEMENT OF RIGHT HIP JOINT, FEMORAL SURFACE WITH CERAMIC SYNTHETIC SUBSTITUTE, UNCEMENTED, OPEN APPROACH: ICD-10-PCS | Performed by: ORTHOPAEDIC SURGERY

## 2019-12-12 PROCEDURE — 99207 ZZC CONSULT E&M CHANGED TO INITIAL LEVEL: CPT | Performed by: PHYSICIAN ASSISTANT

## 2019-12-12 PROCEDURE — 25000128 H RX IP 250 OP 636: Performed by: NURSE ANESTHETIST, CERTIFIED REGISTERED

## 2019-12-12 PROCEDURE — 00000146 ZZHCL STATISTIC GLUCOSE BY METER IP

## 2019-12-12 PROCEDURE — 25000132 ZZH RX MED GY IP 250 OP 250 PS 637: Performed by: ANESTHESIOLOGY

## 2019-12-12 PROCEDURE — C1776 JOINT DEVICE (IMPLANTABLE): HCPCS | Performed by: ORTHOPAEDIC SURGERY

## 2019-12-12 PROCEDURE — 36415 COLL VENOUS BLD VENIPUNCTURE: CPT | Performed by: PHYSICIAN ASSISTANT

## 2019-12-12 PROCEDURE — 25000128 H RX IP 250 OP 636: Performed by: ORTHOPAEDIC SURGERY

## 2019-12-12 PROCEDURE — 36000067 ZZH SURGERY LEVEL 5 1ST 30 MIN: Performed by: ORTHOPAEDIC SURGERY

## 2019-12-12 PROCEDURE — 25000128 H RX IP 250 OP 636: Performed by: PHYSICIAN ASSISTANT

## 2019-12-12 PROCEDURE — 0SP909Z REMOVAL OF LINER FROM RIGHT HIP JOINT, OPEN APPROACH: ICD-10-PCS | Performed by: ORTHOPAEDIC SURGERY

## 2019-12-12 PROCEDURE — 25000125 ZZHC RX 250: Performed by: ORTHOPAEDIC SURGERY

## 2019-12-12 PROCEDURE — 37000008 ZZH ANESTHESIA TECHNICAL FEE, 1ST 30 MIN: Performed by: ORTHOPAEDIC SURGERY

## 2019-12-12 PROCEDURE — 0SPR0JZ REMOVAL OF SYNTHETIC SUBSTITUTE FROM RIGHT HIP JOINT, FEMORAL SURFACE, OPEN APPROACH: ICD-10-PCS | Performed by: ORTHOPAEDIC SURGERY

## 2019-12-12 PROCEDURE — 86900 BLOOD TYPING SEROLOGIC ABO: CPT | Performed by: PHYSICIAN ASSISTANT

## 2019-12-12 PROCEDURE — 0SUA09Z SUPPLEMENT RIGHT HIP JOINT, ACETABULAR SURFACE WITH LINER, OPEN APPROACH: ICD-10-PCS | Performed by: ORTHOPAEDIC SURGERY

## 2019-12-12 PROCEDURE — 86901 BLOOD TYPING SEROLOGIC RH(D): CPT | Performed by: PHYSICIAN ASSISTANT

## 2019-12-12 PROCEDURE — 25800025 ZZH RX 258: Performed by: ORTHOPAEDIC SURGERY

## 2019-12-12 PROCEDURE — 25800030 ZZH RX IP 258 OP 636: Performed by: NURSE ANESTHETIST, CERTIFIED REGISTERED

## 2019-12-12 PROCEDURE — 40000169 ZZH STATISTIC PRE-PROCEDURE ASSESSMENT I: Performed by: ORTHOPAEDIC SURGERY

## 2019-12-12 PROCEDURE — 25000132 ZZH RX MED GY IP 250 OP 250 PS 637: Performed by: PHYSICIAN ASSISTANT

## 2019-12-12 PROCEDURE — 93005 ELECTROCARDIOGRAM TRACING: CPT

## 2019-12-12 PROCEDURE — 36000069 ZZH SURGERY LEVEL 5 EA 15 ADDTL MIN: Performed by: ORTHOPAEDIC SURGERY

## 2019-12-12 PROCEDURE — 85027 COMPLETE CBC AUTOMATED: CPT | Performed by: PHYSICIAN ASSISTANT

## 2019-12-12 PROCEDURE — 25000125 ZZHC RX 250: Performed by: NURSE ANESTHETIST, CERTIFIED REGISTERED

## 2019-12-12 PROCEDURE — 86850 RBC ANTIBODY SCREEN: CPT | Performed by: PHYSICIAN ASSISTANT

## 2019-12-12 PROCEDURE — 37000009 ZZH ANESTHESIA TECHNICAL FEE, EACH ADDTL 15 MIN: Performed by: ORTHOPAEDIC SURGERY

## 2019-12-12 PROCEDURE — 25800030 ZZH RX IP 258 OP 636: Performed by: ANESTHESIOLOGY

## 2019-12-12 PROCEDURE — 87040 BLOOD CULTURE FOR BACTERIA: CPT | Performed by: PHYSICIAN ASSISTANT

## 2019-12-12 PROCEDURE — 25000566 ZZH SEVOFLURANE, EA 15 MIN: Performed by: ORTHOPAEDIC SURGERY

## 2019-12-12 PROCEDURE — 99222 1ST HOSP IP/OBS MODERATE 55: CPT | Performed by: PHYSICIAN ASSISTANT

## 2019-12-12 PROCEDURE — 12000000 ZZH R&B MED SURG/OB

## 2019-12-12 PROCEDURE — 27210794 ZZH OR GENERAL SUPPLY STERILE: Performed by: ORTHOPAEDIC SURGERY

## 2019-12-12 PROCEDURE — 25800030 ZZH RX IP 258 OP 636: Performed by: ORTHOPAEDIC SURGERY

## 2019-12-12 PROCEDURE — P9041 ALBUMIN (HUMAN),5%, 50ML: HCPCS | Performed by: NURSE ANESTHETIST, CERTIFIED REGISTERED

## 2019-12-12 DEVICE — IMP HEAD FEMORAL DEPUY CERAMIC 36MM +8MM 1365-36-330: Type: IMPLANTABLE DEVICE | Site: HIP | Status: FUNCTIONAL

## 2019-12-12 DEVICE — IMPLANTABLE DEVICE: Type: IMPLANTABLE DEVICE | Site: HIP | Status: FUNCTIONAL

## 2019-12-12 RX ORDER — POTASSIUM CL/LIDO/0.9 % NACL 10MEQ/0.1L
10 INTRAVENOUS SOLUTION, PIGGYBACK (ML) INTRAVENOUS
Status: DISCONTINUED | OUTPATIENT
Start: 2019-12-12 | End: 2019-12-15

## 2019-12-12 RX ORDER — POTASSIUM CHLORIDE 1.5 G/1.58G
20-40 POWDER, FOR SOLUTION ORAL
Status: DISCONTINUED | OUTPATIENT
Start: 2019-12-12 | End: 2019-12-15

## 2019-12-12 RX ORDER — ACETAMINOPHEN 325 MG/1
650 TABLET ORAL EVERY 4 HOURS PRN
Status: DISCONTINUED | OUTPATIENT
Start: 2019-12-15 | End: 2019-12-16 | Stop reason: HOSPADM

## 2019-12-12 RX ORDER — CEPHALEXIN 500 MG/1
500 CAPSULE ORAL 4 TIMES DAILY
Status: ON HOLD | COMMUNITY
Start: 2019-12-11 | End: 2019-12-16

## 2019-12-12 RX ORDER — POTASSIUM CHLORIDE 29.8 MG/ML
20 INJECTION INTRAVENOUS
Status: DISCONTINUED | OUTPATIENT
Start: 2019-12-12 | End: 2019-12-15

## 2019-12-12 RX ORDER — NALOXONE HYDROCHLORIDE 0.4 MG/ML
.1-.4 INJECTION, SOLUTION INTRAMUSCULAR; INTRAVENOUS; SUBCUTANEOUS
Status: DISCONTINUED | OUTPATIENT
Start: 2019-12-12 | End: 2019-12-12

## 2019-12-12 RX ORDER — POLYETHYLENE GLYCOL 3350 17 G/17G
17 POWDER, FOR SOLUTION ORAL DAILY
Status: DISCONTINUED | OUTPATIENT
Start: 2019-12-13 | End: 2019-12-16 | Stop reason: HOSPADM

## 2019-12-12 RX ORDER — ACETAMINOPHEN 500 MG
1000 TABLET ORAL ONCE
Status: COMPLETED | OUTPATIENT
Start: 2019-12-12 | End: 2019-12-12

## 2019-12-12 RX ORDER — NALOXONE HYDROCHLORIDE 0.4 MG/ML
.1-.4 INJECTION, SOLUTION INTRAMUSCULAR; INTRAVENOUS; SUBCUTANEOUS
Status: DISCONTINUED | OUTPATIENT
Start: 2019-12-12 | End: 2019-12-16 | Stop reason: HOSPADM

## 2019-12-12 RX ORDER — CEFAZOLIN SODIUM 2 G/100ML
2 INJECTION, SOLUTION INTRAVENOUS EVERY 8 HOURS
Status: COMPLETED | OUTPATIENT
Start: 2019-12-13 | End: 2019-12-13

## 2019-12-12 RX ORDER — OXYCODONE HYDROCHLORIDE 5 MG/1
5-10 TABLET ORAL
Status: DISCONTINUED | OUTPATIENT
Start: 2019-12-12 | End: 2019-12-12

## 2019-12-12 RX ORDER — HYDROMORPHONE HYDROCHLORIDE 1 MG/ML
.3-.5 INJECTION, SOLUTION INTRAMUSCULAR; INTRAVENOUS; SUBCUTANEOUS EVERY 5 MIN PRN
Status: DISCONTINUED | OUTPATIENT
Start: 2019-12-12 | End: 2019-12-12 | Stop reason: HOSPADM

## 2019-12-12 RX ORDER — ONDANSETRON 2 MG/ML
4 INJECTION INTRAMUSCULAR; INTRAVENOUS EVERY 30 MIN PRN
Status: DISCONTINUED | OUTPATIENT
Start: 2019-12-12 | End: 2019-12-12 | Stop reason: HOSPADM

## 2019-12-12 RX ORDER — CEFAZOLIN SODIUM 1 G/3ML
1 INJECTION, POWDER, FOR SOLUTION INTRAMUSCULAR; INTRAVENOUS SEE ADMIN INSTRUCTIONS
Status: DISCONTINUED | OUTPATIENT
Start: 2019-12-12 | End: 2019-12-12 | Stop reason: HOSPADM

## 2019-12-12 RX ORDER — POTASSIUM CHLORIDE 7.45 MG/ML
10 INJECTION INTRAVENOUS
Status: DISCONTINUED | OUTPATIENT
Start: 2019-12-12 | End: 2019-12-15

## 2019-12-12 RX ORDER — BACITRACIN ZINC 500 [USP'U]/G
OINTMENT TOPICAL PRN
Status: DISCONTINUED | OUTPATIENT
Start: 2019-12-12 | End: 2019-12-12 | Stop reason: HOSPADM

## 2019-12-12 RX ORDER — FLUTICASONE PROPIONATE 50 MCG
1 SPRAY, SUSPENSION (ML) NASAL DAILY PRN
Status: DISCONTINUED | OUTPATIENT
Start: 2019-12-12 | End: 2019-12-16 | Stop reason: HOSPADM

## 2019-12-12 RX ORDER — OXYCODONE HYDROCHLORIDE 5 MG/1
5-10 TABLET ORAL
Status: DISCONTINUED | OUTPATIENT
Start: 2019-12-12 | End: 2019-12-16 | Stop reason: HOSPADM

## 2019-12-12 RX ORDER — FENTANYL CITRATE 50 UG/ML
25-50 INJECTION, SOLUTION INTRAMUSCULAR; INTRAVENOUS
Status: DISCONTINUED | OUTPATIENT
Start: 2019-12-12 | End: 2019-12-12 | Stop reason: HOSPADM

## 2019-12-12 RX ORDER — ONDANSETRON 2 MG/ML
4 INJECTION INTRAMUSCULAR; INTRAVENOUS EVERY 6 HOURS PRN
Status: DISCONTINUED | OUTPATIENT
Start: 2019-12-12 | End: 2019-12-12

## 2019-12-12 RX ORDER — HYDROMORPHONE HYDROCHLORIDE 1 MG/ML
.3-.5 INJECTION, SOLUTION INTRAMUSCULAR; INTRAVENOUS; SUBCUTANEOUS
Status: DISCONTINUED | OUTPATIENT
Start: 2019-12-12 | End: 2019-12-15

## 2019-12-12 RX ORDER — ACETAMINOPHEN 325 MG/1
650 TABLET ORAL EVERY 4 HOURS PRN
Status: DISCONTINUED | OUTPATIENT
Start: 2019-12-12 | End: 2019-12-12

## 2019-12-12 RX ORDER — ALBUTEROL SULFATE 90 UG/1
2 AEROSOL, METERED RESPIRATORY (INHALATION) EVERY 6 HOURS PRN
Status: DISCONTINUED | OUTPATIENT
Start: 2019-12-12 | End: 2019-12-16 | Stop reason: HOSPADM

## 2019-12-12 RX ORDER — NEOSTIGMINE METHYLSULFATE 1 MG/ML
VIAL (ML) INJECTION PRN
Status: DISCONTINUED | OUTPATIENT
Start: 2019-12-12 | End: 2019-12-12

## 2019-12-12 RX ORDER — VANCOMYCIN HYDROCHLORIDE 1 G/20ML
INJECTION, POWDER, LYOPHILIZED, FOR SOLUTION INTRAVENOUS PRN
Status: DISCONTINUED | OUTPATIENT
Start: 2019-12-12 | End: 2019-12-12 | Stop reason: HOSPADM

## 2019-12-12 RX ORDER — HYDROXYZINE HYDROCHLORIDE 25 MG/1
25 TABLET, FILM COATED ORAL EVERY 6 HOURS PRN
Status: DISCONTINUED | OUTPATIENT
Start: 2019-12-12 | End: 2019-12-16 | Stop reason: HOSPADM

## 2019-12-12 RX ORDER — PROPOFOL 10 MG/ML
INJECTION, EMULSION INTRAVENOUS PRN
Status: DISCONTINUED | OUTPATIENT
Start: 2019-12-12 | End: 2019-12-12

## 2019-12-12 RX ORDER — ONDANSETRON 4 MG/1
4 TABLET, ORALLY DISINTEGRATING ORAL EVERY 30 MIN PRN
Status: DISCONTINUED | OUTPATIENT
Start: 2019-12-12 | End: 2019-12-12 | Stop reason: HOSPADM

## 2019-12-12 RX ORDER — KETOROLAC TROMETHAMINE 30 MG/ML
30 INJECTION, SOLUTION INTRAMUSCULAR; INTRAVENOUS EVERY 6 HOURS PRN
Status: DISCONTINUED | OUTPATIENT
Start: 2019-12-12 | End: 2019-12-15

## 2019-12-12 RX ORDER — SODIUM CHLORIDE AND POTASSIUM CHLORIDE 150; 900 MG/100ML; MG/100ML
INJECTION, SOLUTION INTRAVENOUS CONTINUOUS
Status: DISCONTINUED | OUTPATIENT
Start: 2019-12-12 | End: 2019-12-13

## 2019-12-12 RX ORDER — AMOXICILLIN 250 MG
2 CAPSULE ORAL 2 TIMES DAILY
Status: DISCONTINUED | OUTPATIENT
Start: 2019-12-12 | End: 2019-12-12

## 2019-12-12 RX ORDER — MONTELUKAST SODIUM 10 MG/1
10 TABLET ORAL DAILY
Status: DISCONTINUED | OUTPATIENT
Start: 2019-12-13 | End: 2019-12-16 | Stop reason: HOSPADM

## 2019-12-12 RX ORDER — FENTANYL CITRATE 50 UG/ML
INJECTION, SOLUTION INTRAMUSCULAR; INTRAVENOUS PRN
Status: DISCONTINUED | OUTPATIENT
Start: 2019-12-12 | End: 2019-12-12

## 2019-12-12 RX ORDER — GLYCOPYRROLATE 0.2 MG/ML
INJECTION, SOLUTION INTRAMUSCULAR; INTRAVENOUS PRN
Status: DISCONTINUED | OUTPATIENT
Start: 2019-12-12 | End: 2019-12-12

## 2019-12-12 RX ORDER — BISACODYL 10 MG
10 SUPPOSITORY, RECTAL RECTAL DAILY PRN
Status: DISCONTINUED | OUTPATIENT
Start: 2019-12-12 | End: 2019-12-16 | Stop reason: HOSPADM

## 2019-12-12 RX ORDER — CEFAZOLIN SODIUM 2 G/100ML
2 INJECTION, SOLUTION INTRAVENOUS
Status: CANCELLED | OUTPATIENT
Start: 2019-12-12

## 2019-12-12 RX ORDER — AMOXICILLIN 250 MG
2 CAPSULE ORAL 2 TIMES DAILY
Status: DISCONTINUED | OUTPATIENT
Start: 2019-12-12 | End: 2019-12-16 | Stop reason: HOSPADM

## 2019-12-12 RX ORDER — ACETAMINOPHEN 500 MG
1000 TABLET ORAL 2 TIMES DAILY PRN
Status: DISCONTINUED | OUTPATIENT
Start: 2019-12-12 | End: 2019-12-12

## 2019-12-12 RX ORDER — ACETAMINOPHEN 325 MG/1
975 TABLET ORAL EVERY 8 HOURS SCHEDULED
Status: COMPLETED | OUTPATIENT
Start: 2019-12-12 | End: 2019-12-15

## 2019-12-12 RX ORDER — VANCOMYCIN HYDROCHLORIDE 1 G/200ML
INJECTION, SOLUTION INTRAVENOUS PRN
Status: DISCONTINUED | OUTPATIENT
Start: 2019-12-12 | End: 2019-12-12

## 2019-12-12 RX ORDER — DULOXETIN HYDROCHLORIDE 30 MG/1
60 CAPSULE, DELAYED RELEASE ORAL DAILY
Status: DISCONTINUED | OUTPATIENT
Start: 2019-12-13 | End: 2019-12-16 | Stop reason: HOSPADM

## 2019-12-12 RX ORDER — LIDOCAINE 40 MG/G
CREAM TOPICAL
Status: DISCONTINUED | OUTPATIENT
Start: 2019-12-12 | End: 2019-12-16 | Stop reason: HOSPADM

## 2019-12-12 RX ORDER — AMLODIPINE BESYLATE 5 MG/1
5 TABLET ORAL DAILY
Status: DISCONTINUED | OUTPATIENT
Start: 2019-12-13 | End: 2019-12-16 | Stop reason: HOSPADM

## 2019-12-12 RX ORDER — MAGNESIUM HYDROXIDE 1200 MG/15ML
LIQUID ORAL PRN
Status: DISCONTINUED | OUTPATIENT
Start: 2019-12-12 | End: 2019-12-12 | Stop reason: HOSPADM

## 2019-12-12 RX ORDER — SODIUM CHLORIDE, SODIUM LACTATE, POTASSIUM CHLORIDE, CALCIUM CHLORIDE 600; 310; 30; 20 MG/100ML; MG/100ML; MG/100ML; MG/100ML
INJECTION, SOLUTION INTRAVENOUS CONTINUOUS PRN
Status: DISCONTINUED | OUTPATIENT
Start: 2019-12-12 | End: 2019-12-12

## 2019-12-12 RX ORDER — ALBUMIN, HUMAN INJ 5% 5 %
SOLUTION INTRAVENOUS CONTINUOUS PRN
Status: DISCONTINUED | OUTPATIENT
Start: 2019-12-12 | End: 2019-12-12

## 2019-12-12 RX ORDER — ONDANSETRON 4 MG/1
4 TABLET, ORALLY DISINTEGRATING ORAL EVERY 6 HOURS PRN
Status: DISCONTINUED | OUTPATIENT
Start: 2019-12-12 | End: 2019-12-16 | Stop reason: HOSPADM

## 2019-12-12 RX ORDER — DEXTROSE MONOHYDRATE 25 G/50ML
25-50 INJECTION, SOLUTION INTRAVENOUS
Status: DISCONTINUED | OUTPATIENT
Start: 2019-12-12 | End: 2019-12-16 | Stop reason: HOSPADM

## 2019-12-12 RX ORDER — ONDANSETRON 4 MG/1
4 TABLET, ORALLY DISINTEGRATING ORAL EVERY 6 HOURS PRN
Status: DISCONTINUED | OUTPATIENT
Start: 2019-12-12 | End: 2019-12-12

## 2019-12-12 RX ORDER — NICOTINE POLACRILEX 4 MG
15-30 LOZENGE BUCCAL
Status: DISCONTINUED | OUTPATIENT
Start: 2019-12-12 | End: 2019-12-16 | Stop reason: HOSPADM

## 2019-12-12 RX ORDER — LIDOCAINE 40 MG/G
CREAM TOPICAL
Status: DISCONTINUED | OUTPATIENT
Start: 2019-12-12 | End: 2019-12-12

## 2019-12-12 RX ORDER — LIDOCAINE HYDROCHLORIDE 20 MG/ML
INJECTION, SOLUTION INFILTRATION; PERINEURAL PRN
Status: DISCONTINUED | OUTPATIENT
Start: 2019-12-12 | End: 2019-12-12

## 2019-12-12 RX ORDER — ONDANSETRON 2 MG/ML
INJECTION INTRAMUSCULAR; INTRAVENOUS PRN
Status: DISCONTINUED | OUTPATIENT
Start: 2019-12-12 | End: 2019-12-12

## 2019-12-12 RX ORDER — SODIUM CHLORIDE, SODIUM LACTATE, POTASSIUM CHLORIDE, CALCIUM CHLORIDE 600; 310; 30; 20 MG/100ML; MG/100ML; MG/100ML; MG/100ML
INJECTION, SOLUTION INTRAVENOUS CONTINUOUS
Status: DISCONTINUED | OUTPATIENT
Start: 2019-12-12 | End: 2019-12-12

## 2019-12-12 RX ORDER — SODIUM CHLORIDE 9 MG/ML
INJECTION, SOLUTION INTRAVENOUS CONTINUOUS
Status: DISCONTINUED | OUTPATIENT
Start: 2019-12-12 | End: 2019-12-12

## 2019-12-12 RX ORDER — POTASSIUM CHLORIDE 1500 MG/1
20-40 TABLET, EXTENDED RELEASE ORAL
Status: DISCONTINUED | OUTPATIENT
Start: 2019-12-12 | End: 2019-12-15

## 2019-12-12 RX ORDER — SODIUM CHLORIDE, SODIUM LACTATE, POTASSIUM CHLORIDE, CALCIUM CHLORIDE 600; 310; 30; 20 MG/100ML; MG/100ML; MG/100ML; MG/100ML
INJECTION, SOLUTION INTRAVENOUS CONTINUOUS
Status: DISCONTINUED | OUTPATIENT
Start: 2019-12-12 | End: 2019-12-12 | Stop reason: HOSPADM

## 2019-12-12 RX ORDER — ATORVASTATIN CALCIUM 20 MG/1
20 TABLET, FILM COATED ORAL DAILY
Status: DISCONTINUED | OUTPATIENT
Start: 2019-12-13 | End: 2019-12-16 | Stop reason: HOSPADM

## 2019-12-12 RX ORDER — AMOXICILLIN 250 MG
1 CAPSULE ORAL 2 TIMES DAILY
Status: DISCONTINUED | OUTPATIENT
Start: 2019-12-12 | End: 2019-12-12

## 2019-12-12 RX ORDER — ONDANSETRON 2 MG/ML
4 INJECTION INTRAMUSCULAR; INTRAVENOUS EVERY 6 HOURS PRN
Status: DISCONTINUED | OUTPATIENT
Start: 2019-12-12 | End: 2019-12-16 | Stop reason: HOSPADM

## 2019-12-12 RX ADMIN — SODIUM CHLORIDE, POTASSIUM CHLORIDE, SODIUM LACTATE AND CALCIUM CHLORIDE: 600; 310; 30; 20 INJECTION, SOLUTION INTRAVENOUS at 18:24

## 2019-12-12 RX ADMIN — ONDANSETRON 4 MG: 2 INJECTION INTRAMUSCULAR; INTRAVENOUS at 18:58

## 2019-12-12 RX ADMIN — FENTANYL CITRATE 100 MCG: 50 INJECTION, SOLUTION INTRAMUSCULAR; INTRAVENOUS at 18:58

## 2019-12-12 RX ADMIN — PHENYLEPHRINE HYDROCHLORIDE 200 MCG: 10 INJECTION INTRAVENOUS at 19:09

## 2019-12-12 RX ADMIN — MIDAZOLAM 2 MG: 1 INJECTION INTRAMUSCULAR; INTRAVENOUS at 18:29

## 2019-12-12 RX ADMIN — PHENYLEPHRINE HYDROCHLORIDE 100 MCG: 10 INJECTION INTRAVENOUS at 18:30

## 2019-12-12 RX ADMIN — ALBUMIN HUMAN: 0.05 INJECTION, SOLUTION INTRAVENOUS at 19:22

## 2019-12-12 RX ADMIN — VANCOMYCIN HYDROCHLORIDE 1 G: 1 INJECTION, SOLUTION INTRAVENOUS at 18:47

## 2019-12-12 RX ADMIN — GLYCOPYRROLATE 0.8 MG: 0.2 INJECTION, SOLUTION INTRAMUSCULAR; INTRAVENOUS at 20:26

## 2019-12-12 RX ADMIN — HYDROMORPHONE HYDROCHLORIDE 0.5 MG: 1 INJECTION, SOLUTION INTRAMUSCULAR; INTRAVENOUS; SUBCUTANEOUS at 20:18

## 2019-12-12 RX ADMIN — LIDOCAINE HYDROCHLORIDE 100 MG: 20 INJECTION, SOLUTION INFILTRATION; PERINEURAL at 18:29

## 2019-12-12 RX ADMIN — NEOSTIGMINE METHYLSULFATE 5 MG: 1 INJECTION, SOLUTION INTRAVENOUS at 20:26

## 2019-12-12 RX ADMIN — PROPOFOL 150 MG: 10 INJECTION, EMULSION INTRAVENOUS at 18:29

## 2019-12-12 RX ADMIN — CEFAZOLIN 2 G: 1 INJECTION, POWDER, FOR SOLUTION INTRAMUSCULAR; INTRAVENOUS at 18:40

## 2019-12-12 RX ADMIN — FENTANYL CITRATE 100 MCG: 50 INJECTION, SOLUTION INTRAMUSCULAR; INTRAVENOUS at 18:29

## 2019-12-12 RX ADMIN — SODIUM CHLORIDE, POTASSIUM CHLORIDE, SODIUM LACTATE AND CALCIUM CHLORIDE: 600; 310; 30; 20 INJECTION, SOLUTION INTRAVENOUS at 19:19

## 2019-12-12 RX ADMIN — POTASSIUM CHLORIDE 40 MEQ: 1500 TABLET, EXTENDED RELEASE ORAL at 22:56

## 2019-12-12 RX ADMIN — ACETAMINOPHEN 975 MG: 325 TABLET, FILM COATED ORAL at 22:56

## 2019-12-12 RX ADMIN — SENNOSIDES AND DOCUSATE SODIUM 2 TABLET: 8.6; 5 TABLET ORAL at 21:51

## 2019-12-12 RX ADMIN — PHENYLEPHRINE HYDROCHLORIDE 200 MCG: 10 INJECTION INTRAVENOUS at 19:54

## 2019-12-12 RX ADMIN — SODIUM CHLORIDE, POTASSIUM CHLORIDE, SODIUM LACTATE AND CALCIUM CHLORIDE: 600; 310; 30; 20 INJECTION, SOLUTION INTRAVENOUS at 21:53

## 2019-12-12 RX ADMIN — ACETAMINOPHEN 1000 MG: 500 TABLET, FILM COATED ORAL at 17:37

## 2019-12-12 RX ADMIN — HYDROMORPHONE HYDROCHLORIDE 0.5 MG: 1 INJECTION, SOLUTION INTRAMUSCULAR; INTRAVENOUS; SUBCUTANEOUS at 20:06

## 2019-12-12 RX ADMIN — FENTANYL CITRATE 50 MCG: 50 INJECTION, SOLUTION INTRAMUSCULAR; INTRAVENOUS at 18:53

## 2019-12-12 RX ADMIN — POTASSIUM CHLORIDE AND SODIUM CHLORIDE: 900; 150 INJECTION, SOLUTION INTRAVENOUS at 22:56

## 2019-12-12 ASSESSMENT — ACTIVITIES OF DAILY LIVING (ADL)
TRANSFERRING: 0-->INDEPENDENT
DRESS: 0-->INDEPENDENT
RETIRED_EATING: 0-->INDEPENDENT
TOILETING: 0-->INDEPENDENT
COGNITION: 0 - NO COGNITION ISSUES REPORTED
RETIRED_COMMUNICATION: 0-->UNDERSTANDS/COMMUNICATES WITHOUT DIFFICULTY
BATHING: 0-->INDEPENDENT
FALL_HISTORY_WITHIN_LAST_SIX_MONTHS: NO
ADLS_ACUITY_SCORE: 13
AMBULATION: 0-->INDEPENDENT
SWALLOWING: 0-->SWALLOWS FOODS/LIQUIDS WITHOUT DIFFICULTY

## 2019-12-12 ASSESSMENT — LIFESTYLE VARIABLES: TOBACCO_USE: 1

## 2019-12-12 ASSESSMENT — MIFFLIN-ST. JEOR: SCORE: 1671.29

## 2019-12-12 NOTE — PROGRESS NOTES
Patient direct admit from Dr. Montez    Right septic total hip arthroplasty  Planning I&D with poly and head exchange at 6 pm with Dr. Montez    NPO  WBAT   Pain medication as needed  Pre-op optimization per hospitalist    Shilpa Cm PAC

## 2019-12-12 NOTE — CONSULTS
Consult Date:  12/12/2019      PRIMARY CARE PROVIDER:  Obed Medley MD.      REQUESTING PHYSICIAN:  Lis Montez MD.      REASON FOR CONSULTATION:  Preoperative clearance.      HISTORY OF PRESENT ILLNESS:  Arsenio Noguera is a 59-year-old male with past medical history of non-insulin dependent type 2 diabetes, hypertension, and dyslipidemia who was directly admitted from [facility] for evaluation of infected right total hip arthroplasty.  The patient underwent a right total hip arthroplasty 08/28/2019.  His recovery was relatively uncomplicated and has done well since.  Two days ago he awoke with increasing right hip pain as well as some redness around his incision.  He was seen by his orthopedic doctor, Dr. Montez, and had fluid drained and was admitted with plans for a washout later today.        Presently, the patient is evaluated in his hospital room with his wife at bedside.  He notes he has been febrile up to 100.8 with some chills.  No malaise.  No runny nose, sore throat or cough.  No chest pain or shortness of breath.  No nausea or vomiting.  No recent exertional chest pain or anginal equivalent.  His asthma has been under good control.      PAST MEDICAL HISTORY:   1.  Insulin-dependent type 2 diabetes.  A1c in 04/2019 was 5.4.   2.  Hypertension.   3.  Dyslipidemia.   4.  Depression.   5.  Spinal stenosis.   6.  Asthma.      PRIOR TO ADMISSION MEDICATIONS:   Prior to Admission medications    Medication Sig Last Dose Taking? Auth Provider   acetaminophen (TYLENOL) 500 MG tablet Take 1,000 mg by mouth 2 times daily as needed for mild pain   Yes Reported, Patient   albuterol (VENTOLIN HFA) 108 (90 Base) MCG/ACT inhaler Inhale 2 puffs into the lungs every 6 hours as needed   Yes Reported, Patient   amLODIPine (NORVASC) 5 MG tablet Take 5 mg by mouth daily  12/12/2019 at Unknown time Yes Reported, Patient   atorvastatin (LIPITOR) 20 MG tablet Take 20 mg by mouth daily 12/12/2019 at Unknown time  Yes Reported, Patient   cephALEXin (KEFLEX) 500 MG capsule Take 500 mg by mouth 4 times daily 12/12/2019 at am Yes Unknown, Entered By History   diphenhydrAMINE-acetaminophen (TYLENOL PM)  MG tablet Take 2 tablets by mouth nightly as needed for sleep  Yes Unknown, Entered By History   DULoxetine (CYMBALTA) 60 MG capsule Take 60 mg by mouth daily  12/12/2019 at Unknown time Yes Reported, Patient   fluticasone (FLONASE) 50 MCG/ACT nasal spray Spray 1 spray in nostril daily as needed   Yes Reported, Patient   fluticasone-salmeterol (ADVAIR DISKUS) 250-50 MCG/DOSE inhaler Inhale 1 puff into the lungs every morning Prescribed BID; pt takes daily. 12/12/2019 at Unknown time Yes Reported, Patient   losartan-hydrochlorothiazide (HYZAAR) 100-25 MG tablet Take 1 tablet by mouth daily  12/12/2019 at Unknown time Yes Reported, Patient   metFORMIN (GLUCOPHAGE) 500 MG tablet Take 500 mg by mouth daily  12/12/2019 at Unknown time Yes Reported, Patient   montelukast (SINGULAIR) 10 MG tablet Take 10 mg by mouth daily  12/12/2019 at Unknown time Yes Reported, Patient   sertraline (ZOLOFT) 50 MG tablet Take 50 mg by mouth daily  12/12/2019 at Unknown time Yes Reported, Patient          ALLERGIES:  ACE INHIBITORS.      SURGICAL HISTORY:  Reviewed in Epic.      FAMILY HISTORY:  Father had colon cancer.  Mother had heart disease.      SOCIAL HISTORY:  He is a nonsmoker.  He does drink on average 4 beers per night.  Denies any history of withdrawal.  No recreational drug use.  He works at [employer].      REVIEW OF SYSTEMS:  A 10-point review of systems was completed.  Pertinent positives are noted in the HPI, all other systems negative.      PHYSICAL EXAMINATION:   GENERAL:  Cheikh Noguera is a well-developed, well-nourished 59-year-old male who is lying comfortably in bed.   VITAL SIGNS:  Blood pressure is 112/71, temperature 105, respiration 16, O2 sat is 99%.   HEAD:  Normocephalic and atraumatic. Mucous membranes moist. Uvula is  midline. Pupils are equal, round and reactive.   CARDIOVASCULAR:  Regular rate and rhythm, no murmurs noted.   PULMONARY:  Normal effort.   LUNGS:  Clear.   ABDOMEN:  Soft, nontender.   EXTREMITIES:  Moves all 4 extremities.  Dorsalis pedis and radial pulse palpable bilaterally.   NEUROLOGIC:  Alert and oriented.  Cranial nerves II through XII grossly intact.      LABORATORY DATA:  BMP, CBC are pending.      ASSESSMENT AND PLAN:  Liborio Noguera is a 59-year-old male with a past medical history of hypertension, type 2 diabetes who is directly admitted from [facility] due to concern of a suspected infected previous right total hip arthroplasty.  Hospitalist Service was consulted for medical comanagement.   1.  Right septic total hip arthroplasty.  He presented to [facility] with a 48-hour history of fevers, chills, increasing pain and redness.  He is admitted now for I and D and poly head exchange.  Procedure will be performed later tonight.  He does have a history of diabetes and hypertension, but these are both under good control.  He denies any recent exertional symptoms.  A BMP and EKG are pending.  Assuming no significant abnormalities, would proceed with planned surgical intervention.  Blood cultures and CBC are pending per primary service.  We will defer further antibiotics per primary service following I and D.   2.  Hypertension.  Blood pressure is adequately controlled.  Prior to admission regimen includes Norvasc 5 mg daily and Losartan/hydrochlorothiazide 100/25 mg tablet daily.  Continue prior to admission Norvasc for now.  Hold losartan/hydrochlorothiazide.  BMP in the morning.  Resume as indicated.   3.  Noninsulin dependent type 2 diabetes.  A1c in 04/2019 was 5.4.  Hold metformin.  Sliding scale insulin with meals and at bedtime.   4.  Depression.  Continue prior to admission Cymbalta.   5.  Dyslipidemia.  Continue prior to admission statin.   6.  Asthma.  Does not appear to be in  acute exacerbation.  Will continue prior to admission inhalers.   7.  Alcohol dependence.  States he drinks 4 beers nightly.  Denies any history of alcohol withdrawal.  Last drink was 48 hours ago.  We will monitor CIWA vital signs and if concerned can initiate withdrawal protocol.   8.  Deep venous thrombosis prophylaxis.  PCDs.      CODE STATUS:  Full code.      We, the Hospitalist Service, thank you for this consult.  We will follow along with you.  Please call if questions.      This patient was discussed with Dr. Huber Alexandra of the Hospitalist Service who independently interviewed the patient.  He is in agreement with the above plan.         HUBER ALEXANDRA MD       As dictated by RONNY HERNÁNDEZ PA-C            D: 2019   T: 2019   MT: WT      Name:     RIN COOK   MRN:      [id number]        Account:       [id number]   :      1960           Consult Date:  2019      Document: [id number]       cc: Lis Medley MD

## 2019-12-12 NOTE — ANESTHESIA PREPROCEDURE EVALUATION
Anesthesia Pre-Procedure Evaluation    Patient: Liborio Noguera   MRN: [id number] : 1960          Preoperative Diagnosis: * No pre-op diagnosis entered *    Procedure(s):  IRRIGATION AND DEBRIDEMENT RIGHT HIP WITH HEAD AND POLY EXCHANGE.(DEPUY)    Past Medical History:   Diagnosis Date     Alcohol abuse      Allergic rhinitis      Anxiety and depression      Asthma      Diabetes (H)      Gout      HTN (hypertension)      Hyperlipidemia      Hyperparathyroidism (H)      Spinal stenosis      Vitiligo      Past Surgical History:   Procedure Laterality Date     ARTHROPLASTY HIP Right 2019    Procedure: RIGHT TOTAL HIP REPLACEMENT;  Surgeon: Lis Montez MD;  Location: [facility] OR     COLONOSCOPY       ENT SURGERY      tonsillectomy     HEMILAMINECTOMY, DISCECTOMY LUMBAR ONE LEVEL, COMBINED Bilateral 5/15/2019    Procedure: LUMBAR 4-5 LAMINECTOMY/DISCECTOMY LUMBAR;  Surgeon: Reza Ya MD;  Location: [facility] OR     HERNIA REPAIR       ORTHOPEDIC SURGERY Right 2008    knee scope     parathyroid exploration Left      testicle removal Right      vitiligo       EKG   12-DEC-2019 15:48:59 [facility]-S5 SO ROUTINE RECORD  Sinus rhythm  Normal ECG  When compared with ECG of 27-AUG-2002 10:00,  Vent. rate has increased BY 35 BPM  Nonspecific T wave abnormality now evident in Anterior leads    Anesthesia Evaluation     . Pt has had prior anesthetic. Type: General    No history of anesthetic complications          ROS/MED HX    ENT/Pulmonary:     (+)allergic rhinitis, tobacco use, Current use asthma Treatment: Inhaler prn,  , . .   (-) sleep apnea   Neurologic: Comment: R foot numbness since laminectomy    (+)neuropathy     Cardiovascular:     (+) Dyslipidemia, hypertension----. : . . . :. . No previous cardiac testing       METS/Exercise Tolerance:  4 - Raking leaves, gardening   Hematologic:  - neg hematologic  ROS   (+) Anemia, -      Musculoskeletal: Comment: S/p right CAMILLA 2019 now  "with infected R  CAMILLA    Spinal stenosis  (+) arthritis,  other musculoskeletal- chronic lumbar back pain      GI/Hepatic:  - neg GI/hepatic ROS      (-) GERD   Renal/Genitourinary: Comment: hypokalemia        Endo:     (+) type II DM Last HgA1c: 5.4 date: 4/2019 Not using insulin - not using insulin pump Normal glucose range: low 100's Other Endocrine Disorder hyperparathyroidism.      Psychiatric: Comment: ETOH dependence - 4 beers per night    (+) psychiatric history depression      Infectious Disease:   (+) Recent Fever (Temp 100.8 with chills),       Malignancy:      - no malignancy   Other: Comment: H/O Alcohol abuse   (+) H/O Chronic Pain,                        Physical Exam  Normal systems: cardiovascular, pulmonary and dental    Airway   Mallampati: II  TM distance: >3 FB  Neck ROM: full    Dental     Cardiovascular   Rhythm and rate: regular and normal      Pulmonary    breath sounds clear to auscultation            Lab Results   Component Value Date    WBC 14.2 (H) 12/12/2019    HGB 10.0 (L) 12/12/2019    HCT 31.1 (L) 12/12/2019     12/12/2019    .0 (H) 12/12/2019    SED 57 (H) 12/12/2019     (L) 12/12/2019    POTASSIUM 3.1 (L) 12/12/2019    CHLORIDE 104 12/12/2019    CO2 24 12/12/2019    BUN 27 12/12/2019    CR 1.25 12/12/2019     (H) 12/12/2019    DOLORES 8.8 12/12/2019       Preop Vitals  BP Readings from Last 3 Encounters:   12/12/19 112/71   08/29/19 116/75   08/15/19 126/72    Pulse Readings from Last 3 Encounters:   12/12/19 98   08/29/19 66   08/15/19 70      Resp Readings from Last 3 Encounters:   12/12/19 16   08/29/19 16   08/15/19 17    SpO2 Readings from Last 3 Encounters:   12/12/19 99%   08/29/19 95%   08/15/19 97%      Temp Readings from Last 1 Encounters:   12/12/19 38.1  C (100.5  F)    Ht Readings from Last 1 Encounters:   08/28/19 1.676 m (5' 6\")      Wt Readings from Last 1 Encounters:   08/28/19 87.2 kg (192 lb 4.8 oz)    Estimated body mass index is 31.04 " "kg/m  as calculated from the following:    Height as of 8/28/19: 1.676 m (5' 6\").    Weight as of 8/28/19: 87.2 kg (192 lb 4.8 oz).       Anesthesia Plan      History & Physical Review  History and physical reviewed and following examination; no interval change.    ASA Status:  2 emergent.    NPO Status:  Waived due to emergency (last ate at 11)    Plan for General with Propofol induction. Maintenance will be Balanced.    PONV prophylaxis:  Ondansetron (or other 5HT-3)       Postoperative Care  Postoperative pain management:  IV analgesics.      Consents  Anesthetic plan, risks, benefits and alternatives discussed with:  Patient and Spouse.  Use of blood products discussed: Yes.   Use of blood products discussed with Patient and Spouse.  Consented to blood products.  .                 Tristen Fu MD  "

## 2019-12-12 NOTE — PHARMACY-ADMISSION MEDICATION HISTORY
Pharmacy Medication History  Admission medication history interview status for the 12/12/2019  admission is complete. See EPIC admission navigator for prior to admission medications     Medication history sources: Patient  Medication history source reliability: Good  Adherence assessment: Good    Significant changes made to the medication list:  Removed aspirin 325mg and prn hydroxyzine, oxycodone and ondansetron.        Additional medication history information:   Started a course of Keflex yesterday.    Medication reconciliation completed by provider prior to medication history? Yes    Time spent in this activity: 20 min      Prior to Admission medications    Medication Sig Last Dose Taking? Auth Provider   acetaminophen (TYLENOL) 500 MG tablet Take 1,000 mg by mouth 2 times daily as needed for mild pain   Yes Reported, Patient   albuterol (VENTOLIN HFA) 108 (90 Base) MCG/ACT inhaler Inhale 2 puffs into the lungs every 6 hours as needed   Yes Reported, Patient   amLODIPine (NORVASC) 5 MG tablet Take 5 mg by mouth daily  12/12/2019 at Unknown time Yes Reported, Patient   atorvastatin (LIPITOR) 20 MG tablet Take 20 mg by mouth daily 12/12/2019 at Unknown time Yes Reported, Patient   cephALEXin (KEFLEX) 500 MG capsule Take 500 mg by mouth 4 times daily 12/12/2019 at am Yes Unknown, Entered By History   diphenhydrAMINE-acetaminophen (TYLENOL PM)  MG tablet Take 2 tablets by mouth nightly as needed for sleep  Yes Unknown, Entered By History   DULoxetine (CYMBALTA) 60 MG capsule Take 60 mg by mouth daily  12/12/2019 at Unknown time Yes Reported, Patient   fluticasone (FLONASE) 50 MCG/ACT nasal spray Spray 1 spray in nostril daily as needed   Yes Reported, Patient   fluticasone-salmeterol (ADVAIR DISKUS) 250-50 MCG/DOSE inhaler Inhale 1 puff into the lungs every morning Prescribed BID; pt takes daily. 12/12/2019 at Unknown time Yes Reported, Patient   losartan-hydrochlorothiazide (HYZAAR) 100-25 MG tablet Take 1  tablet by mouth daily  12/12/2019 at Unknown time Yes Reported, Patient   metFORMIN (GLUCOPHAGE) 500 MG tablet Take 500 mg by mouth daily  12/12/2019 at Unknown time Yes Reported, Patient   montelukast (SINGULAIR) 10 MG tablet Take 10 mg by mouth daily  12/12/2019 at Unknown time Yes Reported, Patient   sertraline (ZOLOFT) 50 MG tablet Take 50 mg by mouth daily  12/12/2019 at Unknown time Yes Reported, Patient

## 2019-12-13 ENCOUNTER — HOME INFUSION (PRE-WILLOW HOME INFUSION) (OUTPATIENT)
Dept: PHARMACY | Facility: CLINIC | Age: 59
End: 2019-12-13

## 2019-12-13 ENCOUNTER — APPOINTMENT (OUTPATIENT)
Dept: PHYSICAL THERAPY | Facility: CLINIC | Age: 59
DRG: 466 | End: 2019-12-13
Attending: PHYSICIAN ASSISTANT
Payer: COMMERCIAL

## 2019-12-13 LAB
ANION GAP SERPL CALCULATED.3IONS-SCNC: 5 MMOL/L (ref 3–14)
BUN SERPL-MCNC: 21 MG/DL (ref 7–30)
CALCIUM SERPL-MCNC: 8.1 MG/DL (ref 8.5–10.1)
CHLORIDE SERPL-SCNC: 105 MMOL/L (ref 94–109)
CO2 SERPL-SCNC: 25 MMOL/L (ref 20–32)
CREAT SERPL-MCNC: 1.11 MG/DL (ref 0.66–1.25)
GFR SERPL CREATININE-BSD FRML MDRD: 72 ML/MIN/{1.73_M2}
GLUCOSE BLDC GLUCOMTR-MCNC: 106 MG/DL (ref 70–99)
GLUCOSE BLDC GLUCOMTR-MCNC: 118 MG/DL (ref 70–99)
GLUCOSE BLDC GLUCOMTR-MCNC: 122 MG/DL (ref 70–99)
GLUCOSE BLDC GLUCOMTR-MCNC: 126 MG/DL (ref 70–99)
GLUCOSE SERPL-MCNC: 112 MG/DL (ref 70–99)
HGB BLD-MCNC: 8.2 G/DL (ref 13.3–17.7)
POTASSIUM SERPL-SCNC: 3.5 MMOL/L (ref 3.4–5.3)
POTASSIUM SERPL-SCNC: NORMAL MMOL/L (ref 3.4–5.3)
SODIUM SERPL-SCNC: 135 MMOL/L (ref 133–144)

## 2019-12-13 PROCEDURE — L2820 SOFT INTERFACE BELOW KNEE SE: HCPCS

## 2019-12-13 PROCEDURE — 97161 PT EVAL LOW COMPLEX 20 MIN: CPT | Mod: GP

## 2019-12-13 PROCEDURE — L1686 HO POST-OP HIP ABDUCTION: HCPCS

## 2019-12-13 PROCEDURE — 25000128 H RX IP 250 OP 636: Performed by: PHYSICIAN ASSISTANT

## 2019-12-13 PROCEDURE — 40000894 ZZH STATISTIC OT IP EVAL DEFER: Performed by: OCCUPATIONAL THERAPIST

## 2019-12-13 PROCEDURE — 00000146 ZZHCL STATISTIC GLUCOSE BY METER IP

## 2019-12-13 PROCEDURE — 80048 BASIC METABOLIC PNL TOTAL CA: CPT | Performed by: PHYSICIAN ASSISTANT

## 2019-12-13 PROCEDURE — 85018 HEMOGLOBIN: CPT | Performed by: PHYSICIAN ASSISTANT

## 2019-12-13 PROCEDURE — 25000128 H RX IP 250 OP 636: Performed by: INTERNAL MEDICINE

## 2019-12-13 PROCEDURE — 97530 THERAPEUTIC ACTIVITIES: CPT | Mod: GP

## 2019-12-13 PROCEDURE — 25000132 ZZH RX MED GY IP 250 OP 250 PS 637: Performed by: PHYSICIAN ASSISTANT

## 2019-12-13 PROCEDURE — 99232 SBSQ HOSP IP/OBS MODERATE 35: CPT | Performed by: INTERNAL MEDICINE

## 2019-12-13 PROCEDURE — 36415 COLL VENOUS BLD VENIPUNCTURE: CPT | Performed by: PHYSICIAN ASSISTANT

## 2019-12-13 PROCEDURE — 97116 GAIT TRAINING THERAPY: CPT | Mod: GP

## 2019-12-13 PROCEDURE — 12000000 ZZH R&B MED SURG/OB

## 2019-12-13 PROCEDURE — L2624 HIP ADJ FLEX EXT ABDUCT CONT: HCPCS

## 2019-12-13 RX ORDER — HYDROXYZINE HYDROCHLORIDE 25 MG/1
25 TABLET, FILM COATED ORAL EVERY 6 HOURS PRN
Qty: 30 TABLET | Refills: 0 | Status: SHIPPED | OUTPATIENT
Start: 2019-12-13

## 2019-12-13 RX ORDER — AMOXICILLIN 250 MG
2 CAPSULE ORAL 2 TIMES DAILY
Qty: 30 TABLET | Refills: 0 | Status: SHIPPED | OUTPATIENT
Start: 2019-12-13

## 2019-12-13 RX ORDER — OXYCODONE HYDROCHLORIDE 5 MG/1
5-10 TABLET ORAL
Qty: 30 TABLET | Refills: 0 | Status: SHIPPED | OUTPATIENT
Start: 2019-12-13

## 2019-12-13 RX ORDER — ASPIRIN 325 MG
325 TABLET, DELAYED RELEASE (ENTERIC COATED) ORAL DAILY
Qty: 30 TABLET | Refills: 0 | Status: SHIPPED | OUTPATIENT
Start: 2019-12-14

## 2019-12-13 RX ORDER — CEFAZOLIN SODIUM 2 G/100ML
2 INJECTION, SOLUTION INTRAVENOUS EVERY 8 HOURS
Status: DISCONTINUED | OUTPATIENT
Start: 2019-12-13 | End: 2019-12-16 | Stop reason: HOSPADM

## 2019-12-13 RX ORDER — ONDANSETRON 4 MG/1
4 TABLET, ORALLY DISINTEGRATING ORAL EVERY 6 HOURS PRN
Qty: 30 TABLET | Refills: 0 | Status: SHIPPED | OUTPATIENT
Start: 2019-12-13

## 2019-12-13 RX ADMIN — MONTELUKAST 10 MG: 10 TABLET, FILM COATED ORAL at 08:49

## 2019-12-13 RX ADMIN — AMLODIPINE BESYLATE 5 MG: 5 TABLET ORAL at 08:49

## 2019-12-13 RX ADMIN — ACETAMINOPHEN 975 MG: 325 TABLET, FILM COATED ORAL at 21:34

## 2019-12-13 RX ADMIN — DULOXETINE HYDROCHLORIDE 60 MG: 30 CAPSULE, DELAYED RELEASE ORAL at 08:49

## 2019-12-13 RX ADMIN — ACETAMINOPHEN 975 MG: 325 TABLET, FILM COATED ORAL at 05:38

## 2019-12-13 RX ADMIN — ASPIRIN 325 MG: 325 TABLET, DELAYED RELEASE ORAL at 08:49

## 2019-12-13 RX ADMIN — ACETAMINOPHEN 975 MG: 325 TABLET, FILM COATED ORAL at 13:44

## 2019-12-13 RX ADMIN — KETOROLAC TROMETHAMINE 30 MG: 30 INJECTION, SOLUTION INTRAMUSCULAR at 11:59

## 2019-12-13 RX ADMIN — CEFAZOLIN SODIUM 2 G: 2 INJECTION, SOLUTION INTRAVENOUS at 16:19

## 2019-12-13 RX ADMIN — SERTRALINE HYDROCHLORIDE 50 MG: 50 TABLET ORAL at 08:49

## 2019-12-13 RX ADMIN — CEFAZOLIN SODIUM 2 G: 2 INJECTION, SOLUTION INTRAVENOUS at 11:11

## 2019-12-13 RX ADMIN — POTASSIUM CHLORIDE 20 MEQ: 1500 TABLET, EXTENDED RELEASE ORAL at 01:06

## 2019-12-13 RX ADMIN — CEFAZOLIN SODIUM 2 G: 2 INJECTION, SOLUTION INTRAVENOUS at 02:06

## 2019-12-13 RX ADMIN — ATORVASTATIN CALCIUM 20 MG: 20 TABLET, FILM COATED ORAL at 08:49

## 2019-12-13 ASSESSMENT — ACTIVITIES OF DAILY LIVING (ADL)
ADLS_ACUITY_SCORE: 13
ADLS_ACUITY_SCORE: 13
ADLS_ACUITY_SCORE: 14
ADLS_ACUITY_SCORE: 14
ADLS_ACUITY_SCORE: 13
ADLS_ACUITY_SCORE: 14

## 2019-12-13 NOTE — PROGRESS NOTES
[facility]    Received referral for IV abx.  Benefits verified, covered at 100%.  Will meet with patient to introduce home infusion services, review benefits and offer choice of providers.      Thank you for the referral.    SCOTTIE MaganaN, [facility]  [phone]

## 2019-12-13 NOTE — OP NOTE
Procedure Date: 12/12/2019      PREOPERATIVE DIAGNOSIS:  Septic right total hip arthroplasty and greater trochanteric avulsion fracture.      POSTOPERATIVE DIAGNOSES:  Septic right total hip arthroplasty and greater trochanteric avulsion fracture.      PROCEDURE:  Irrigation and debridement of right total hip arthroplasty with exchange of the polyethylene liner of the acetabulum and the femoral head followed by abductor repair.      INDICATIONS FOR PROCEDURE:  The patient is a 59-year-old male who is 4-1/2 months out from uncomplicated right total hip arthroplasty.  He was doing very well until approximately 36 hours ago.  I was called by his nurse practitioner in [facility], stating that he had an elevated white count and a rash or a possible cellulitis involving the buttock area.  The patient came down to my clinic immediately yesterday evening.  I saw him, at which time, I felt his exam was consistent with cellulitis.  He had painless range of motion of his hip.  He was started on Keflex prior to seeing me.  I kept him on the Keflex and had him come back to clinic today.  At this time, he had what felt to be fluctuance in his buttock area.  I aspirated it and it had the return of bloody fluid.  This was sent for Gram stain and culture, results of which are not available at the time of this dictation.  I recommended irrigation and debridement, head and liner exchange.  I explained the risks, benefits, potential complications of this with the patient in detail.  I told him if this were to fail that we would need to consider removal of the components in a staged revision procedure.  The plan is to get Infectious Disease involved and he will need 6 weeks of IV antibiotics after this procedure.  He is aware of the fact that this procedure that we are doing tonight may not successfully treat his infection; however, his symptoms were within 36 hours and I felt this was acute enough that we should proceed with I  and D and head and liner exchange.  His radiographs did not show any evidence of loosening of the components.  I discussed the risks of the surgery.  This included but not specific to ongoing infection, vascular neurologic complications, DVT, dislocation of the hip, leg length inequality, the possible need for further revision surgery.  He voiced understanding and wanted to proceed.      ANESTHESIA:  General.      SURGEON:  Lis Montez MD      ASSISTANT:  DAVID KAUR PA-C      DESCRIPTION OF PROCEDURE:  The patient was taken to the operating room and placed on the operating table in the supine position.  After adequate induction of general anesthetic, he was transferred to lateral position and held this way with Spear hip renae.  Axillary roll was placed.  Care was taken to pad all bony prominences.  The patient was given a gram of vancomycin for infection prophylaxis given 1 hour prior to incision.  We then performed a sterile prep and drape of the right hip and right lower extremity.  After sterile prep and drape, an incision was made using the old surgical scar.  I proceeded down onto the tensor fascia and divided it.  There was a rent in the tensor fascia.  I encountered a bloody, somewhat turbid-colored fluid.  We irrigated and we were able to gain exposure down to the hip capsule.  Hip capsule repair had been divided and the abductor repair had torn and there was a fracture of the greater trochanter that was displaced.  It was a small avulsion fracture.  I did not feel I would be able to get this to heal so I elected to remove the fragment and reattach the abductors to the greater trochanter.  This was done through bony tunnels.  However, I did not do the repair until after we were done with the I and D and poly exchange and head exchange.  A synovectomy was done.  I then dislocated the hip and removed the femoral head.  We then gained exposure of the acetabulum and removed the polyethylene liner  without difficulty.  The acetabulum was well fixed, as was the femoral component.  We then irrigated using 7 liters of antibiotic saline.  We then applied a new head and a new polyethylene liner and soaked the hip in a dilute solution of Betadine for 3 minutes and irrigated with pulse jet lavage, using approximately 1 liter of antibiotic saline in the pulse jet lavage.  We then repaired the abductors using bony tunnels through the greater trochanter.  This was repaired using #2 Maxon double stranded.  We then placed a gram of vancomycin in the wound and closed the fascial layer using double stranded 0 Maxon and 0 PDS.  The deep subcutaneous was closed using 0 PDS, superficial subcutaneous was closed with 2-0 PDS.  The skin was closed with staples.  At each layer of closure, the wound was copiously irrigated with antibiotic saline.  Sterile dressing was applied.  The patient tolerated the operative procedure.  There were no intraoperative complications.  The patient went to PAR in stable condition.        PLAN:  Will be for the patient to be weightbearing as tolerated in an abduction brace for the next 6-8 weeks.  ID will see the patient and he will be started on 6 weeks of IV antibiotics once the culture information is available.  In the meantime, we will keep him on vancomycin 1 gram IV q.12 hours.  DVT prophylaxis will be with aspirin.         BEATRIZ CARPIO MD             D: 2019   T: 2019   MT: JOHANNA      Name:     RIN COOK   MRN:      [id number]        Account:        [id number]   :      1960           Procedure Date: 2019      Document: [id number]

## 2019-12-13 NOTE — PLAN OF CARE
Pt A&O. Arrived on unit at 2130. Vss on 1L nc overnight. CMS intact. Dressing to hip CDI. Adductor pillow between legs. Denies pain. , 122. Voiding in urinal. Needs to dangle. Will continue to monitor.

## 2019-12-13 NOTE — PLAN OF CARE
.Discharge Planner OT   Patient plan for discharge: Home w/ spouse assist  Current status: 60y/o M POD #1 I and D right hip with head and poly exchange and abductor repair. Orders for anterio-lateral precautions, hip abductor brace and WBAT. Hx: R CAMILLA 08/29/2019. PLOF: pt reports was moving IND and back to work at [employer]-on his feet 9 hours/day. Lives with his wife with 3 AIRAM with bilateral railings.      Per PT, moving w/ min. A. Attempted OT evaluation--after discussion w/ pt., did not feel OT intervention warranted at this time. Pt. familiar w/ hip precautions from sx. 4 months ago, has DME/AE at  home + will have spouse  to assist. Pt. reports he had training from orthotist on elvis/doff hip abductor brace, spouse will assist as well. No skilled OT intervention needed at this time. Will complete order, sign-off. Nursing aware, in agreement.  Barriers to return to prior living situation: Defer to PT  Recommendations for discharge: Defer to PT;No skilled OT needed at this time.  Rationale for recommendations: Pt. related he is familiar w/ AE/DME + hip precautions from last visit. Pt. is mobilizing well w/ PT. Pt. will have spouse to assist at home.       Entered by: Nannette Cordero 12/13/2019 3:35 PM

## 2019-12-13 NOTE — PROGRESS NOTES
[facility]    Medicine Progress Note - Hospitalist Service       Date of Admission:  12/12/2019  Assessment & Plan   Liborio Noguera is a 59-year-old male with h/o T2DM, directly admitted from [facility]opedics Clinic with suspected infected right total hip arthroplasty.  Hospitalist Service was consulted for medical comanagement.     Right septic total hip arthroplasty.  He presented to Orthopedic Clinic with a 48-hour history of fevers, chills, increasing pain and redness.  WBC 14.2, Tmax 100.5 at admission  S/p I&D of R CAMILLA with exchange of the polyethylene liner of the acetabulum and the femoral head followed by abductor repair on 12/12/19 by Dr. Montez     - Routine post-operative care, including pain mgt and DVT prophylaxis, per surgical team.   - Afebrile since admission, hemodynamically stable but BP soft, Cr stable. > stop IVF  - ID consulted  - On cefazolin IV per primary service.     Hypertension   - As above, on 12/13/19, BP remain soft.   - Continue PTA 5 norvasc with hold parameters.   - Hold losartan/hydrochlorothiazide as blood pressures remain soft.     Noninsulin dependent type 2 diabetes.  A1c in 04/2019 was 5.4.    - Holding metformin.  Sliding scale insulin with meals and at bedtime.     Recent Labs   Lab 12/13/19  1625 12/13/19  1152 12/13/19  0503 12/13/19  0158 12/12/19  2149 12/12/19  1541   GLC  --   --  112*  --   --  100*   * 106*  --  122* 109*  --          Depression.  Continue PTA Cymbalta.     Dyslipidemia.  Continue PTA statin.     Asthma.  Does not appear to be in acute exacerbation.  Will continue prior to admission inhalers.     Alcohol dependece.  States he drinks 4 beers nightly.  Denies any history of alcohol withdrawal.  Last drink was 48 hours ago.  We will monitor [facility] vital signs and if concerned can initiate withdrawal protocol.       Diet: Advance Diet as Tolerated: Regular Diet Adult    DVT Prophylaxis: On  mg daily per ortho.   {Pepe  Catheter: not present      Disposition Plan   Expected discharge: 2 - 3 days, per ID and ortho plan  Entered: Celeste Styles MD 12/13/2019, 10:21 AM       The patient's care was discussed with the Bedside Nurse and Patient.    Celeste Styles MD  Hospitalist Service  [facility]    ______________________________________________________________________    Interval History   Patient is doing well, has good attitude. Pain controlled. Had fever overnight, now afebrile. Hemodynamically stable without light-headedness. Tolerating diet. No N/V, abdominal pain.     Data reviewed today: I reviewed all medications, new labs and imaging results over the last 24 hours. I personally reviewed no images or EKG's today.    Physical Exam   Vital Signs: Temp: 98.4  F (36.9  C) Temp src: Oral BP: 101/60 Pulse: 70   Resp: 16 SpO2: 96 % O2 Device: None (Room air) Oxygen Delivery: 1 LPM  Weight: 199 lbs 0 oz  Constitutional: NAD,   Neuropsyche:  alert and oriented, answers questions appropriately.   Respiratory:  Breathing comfortably, good air exchange, no wheezes, no crackles.   Cardiovascular:  Regular rate and rhythm, no edema.  GI:  soft, NT/ND, BS normal  Skin/Integumen:  No acute rash, bruising or sign of bleeding.         Data   Recent Labs   Lab 12/13/19  0503 12/12/19  1541   WBC  --  14.2*   HGB 8.2* 10.0*   MCV  --  81   PLT  --  388    132*   POTASSIUM 3.5  Canceled, Test credited 3.1*   CHLORIDE 105 104   CO2 25 24   BUN 21 27   CR 1.11 1.25   ANIONGAP 5 4   DOLORES 8.1* 8.8   * 100*     No results found for this or any previous visit (from the past 24 hour(s)).  Medications       acetaminophen  975 mg Oral Q8H ESTEFANIA     amLODIPine  5 mg Oral Daily     aspirin  325 mg Oral Daily     atorvastatin  20 mg Oral Daily     ceFAZolin  2 g Intravenous Q8H     DULoxetine  60 mg Oral Daily     fluticasone-vilanterol  1 puff Inhalation Daily     insulin aspart  1-7 Units Subcutaneous TID AC     insulin  aspart  1-5 Units Subcutaneous At Bedtime     montelukast  10 mg Oral Daily     polyethylene glycol  17 g Oral Daily     senna-docusate  2 tablet Oral BID     sertraline  50 mg Oral Daily     sodium chloride (PF)  3 mL Intracatheter Q8H

## 2019-12-13 NOTE — PROGRESS NOTES
"Liborio Noguera  2019  POD # 1 sp hip I and D.    Admit Date:  2019      Doing well.  Normal healing wound.  No immediate surgical complications identified.  No excessive bleeding  Pain well-controlled.  Objective:  Blood pressure 101/60, pulse 70, temperature 98.4  F (36.9  C), temperature source Oral, resp. rate 16, height 1.702 m (5' 7\"), weight 90.3 kg (199 lb), SpO2 96 %.    Temperatures:  Current - Temp: 98.4  F (36.9  C); Max - Temp  Av.5  F (36.9  C)  Min: 97.9  F (36.6  C)  Max: 100.5  F (38.1  C)  Pulse range: Pulse  Av.7  Min: 63  Max: 98  Blood pressure range: Systolic (24hrs), Av , Min:97 , Max:129   ; Diastolic (24hrs), Av, Min:58, Max:85    Exam:  CMS: intact  alert, stable, wound ok  Calf nontender b le.       Labs:  Recent Labs   Lab Test 19  0503 19  1541 19  0705   POTASSIUM 3.5  Canceled, Test credited 3.1* 3.6     Recent Labs   Lab Test 19  0503 19  1541 19  0705   HGB 8.2* 10.0* 9.2*     No results for input(s): INR in the last 04378 hours.  Recent Labs   Lab Test 19  1541          PLAN: Weight bearing as tolerated  Pain control measures  Additional problems to be followed by medicine physician  Abduction brace for 8 wks.   Infectious disease to see today.       "

## 2019-12-13 NOTE — BRIEF OP NOTE
[facility]    Brief Operative Note    Pre-operative diagnosis: * No pre-op diagnosis entered *  Post-operative diagnosis Same as pre-operative diagnosis    Procedure: Procedure(s):  IRRIGATION AND DEBRIDEMENT RIGHT HIP WITH HEAD AND POLY EXCHANGE AND  ABDUCTOR REPAIR  Surgeon: Surgeon(s) and Role:     * Lis Montez MD - Primary     * Camacho Carlisle PA-C - Assisting  Anesthesia: General   Estimated blood loss: 200 ml  Drains: None  Specimens: * No specimens in log *  Findings:   None.  Complications: None.  Implants:   Implant Name Type Inv. Item Serial No.  Lot No. LRB No. Used   IMP HEAD FEMORAL DEPUY CERAMIC 36MM +8MM 1365- Total Joint Component/Insert IMP HEAD FEMORAL DEPUY CERAMIC 36MM +8MM 1365-  [id number] Right 1   acentabular liner 4 neutral 36mm ID [id number] OD    Depuy [id number] Right 1

## 2019-12-13 NOTE — PROGRESS NOTES
If home antibiotics are needed, patient has [employer]. The patient is covered at 100% for services. He has met the $6,850 deductible and the $7,850 out of pocket max.    Domitila Arredondo RN, BSN  Inpatient Care Coordination  [phone]  [facility]

## 2019-12-13 NOTE — PROGRESS NOTES
[facility]    Referral received for [facility] to provide IV abx therapy.  I met with patient at bedside today.  Introduced myself and my role to assist with a safe  transition to home.   Offered some preliminary information about I services and offered choice of providers. Let patient know I am available M-F and would continue to follow and be available for any questions.   I brochure left with patient/caregiver  as well as information on how to reach me if they have any additional questions.      If patient is to discontinue home over the weekend please call [phone]    Thank you for the home infusion referral.    CHARLA Magana, VANESSA  [facility]  [phone]

## 2019-12-13 NOTE — PROGRESS NOTES
Therapy: IV ABX  Insurance: LIZETTE   Ded: $6850  Met: $6850    Co-Insurance: 0  Max Out of Pocket: $7850  Met: $7850    Please contact Intake with any questions, [phone] or In Basket pool, [facility] ([id number]).  IN REFERENCE TO ADMISSION DATE 12/13/2019 TO CHECK IV ABX COVERAGE.

## 2019-12-13 NOTE — PROVIDER NOTIFICATION
Paged re: fluid and potassium questions.  Called back, nursing staff not initially available. Requested repage when available.    Patient with duplicate fluid orders     Appears to have had LR at 25/hr ordered post op.  Suspect this is a standard post op order/included on an order set.    Discontinue LR at 25/hr, continue prior fluids NS +K at 100/hr    Continue potassium replacement protocol as ordered.    Popeye Falcon MD  10:39 PM

## 2019-12-13 NOTE — ANESTHESIA CARE TRANSFER NOTE
Patient: Liborio Noguera    Procedure(s):  IRRIGATION AND DEBRIDEMENT RIGHT HIP WITH HEAD AND POLY EXCHANGE AND  ABDUCTOR REPAIR    Diagnosis: * No pre-op diagnosis entered *  Diagnosis Additional Information: No value filed.    Anesthesia Type:   General     Note:  Airway :Face Mask  Patient transferred to:PACU  Handoff Report: Identifed the Patient, Identified the Reponsible Provider, Reviewed the pertinent medical history, Discussed the surgical course, Reviewed Intra-OP anesthesia mangement and issues during anesthesia, Set expectations for post-procedure period and Allowed opportunity for questions and acknowledgement of understanding      Vitals: (Last set prior to Anesthesia Care Transfer)    CRNA VITALS  12/12/2019 2003 - 12/12/2019 2034 12/12/2019             Pulse:  105    SpO2:  96 %    Resp Rate (observed):  11    Resp Rate (set):  10                Electronically Signed By: JOEL Andrew CRNA  December 12, 2019  8:34 PM

## 2019-12-13 NOTE — PROGRESS NOTES
12/13/19 1027   Quick Adds   Type of Visit Initial PT Evaluation   Living Environment   Lives With spouse   Living Arrangements house   Home Accessibility stairs to enter home;stairs within home   Number of Stairs, Main Entrance 3   Stair Railings, Main Entrance railings on both sides of stairs   Living Environment Comment Pt lives with spouse in a 2SH with bed and bathroom on the mainfloor. Pt has 3 steps to enter home with B rail support   Self-Care   Usual Activity Tolerance good   Current Activity Tolerance moderate   Regular Exercise Yes  (reports was doing hip ex. )   Equipment Currently Used at Home none   Activity/Exercise/Self-Care Comment works at [employer].    Functional Level Prior   Ambulation 0-->independent   Transferring 0-->independent   Toileting 0-->independent   Bathing 0-->independent   Communication 0-->understands/communicates without difficulty   Swallowing 0-->swallows foods/liquids without difficulty   Cognition 0 - no cognition issues reported   Fall history within last six months no   Which of the above functional risks had a recent onset or change? ambulation;transferring   Prior Functional Level Comment Pt IND with all mobility at basement.    General Information   Onset of Illness/Injury or Date of Surgery - Date 12/12/19   Referring Physician Camacho Carlisle PA-C   Patient/Family Goals Statement to return home   Pertinent History of Current Problem (include personal factors and/or comorbidities that impact the POC) 60y/o F POD #1 IRRIGATION AND DEBRIDEMENT RIGHT HIP WITH HEAD AND POLY EXCHANGE AND  ABDUCTOR REPAIR   Precautions/Limitations fall precautions;right hip precautions  (anterio-lateral hip precautions on right LE)   Weight-Bearing Status - RLE weight-bearing as tolerated   General Observations resting in bed, right hip abductor brace on    General Info Comments Activity orders: Amb with assist   Cognitive Status Examination   Level of  Consciousness alert   Follows Commands and Answers Questions 100% of the time   Personal Safety and Judgment intact   Memory intact   Pain Assessment   Patient Currently in Pain Yes, see Vital Sign flowsheet  (right hip 5/10)   Integumentary/Edema   Integumentary/Edema Comments right hip dressing intact   Range of Motion (ROM)   ROM Comment right hip abductor brace on, no impairements noted with bilateral UE, left LE and right knee/ankle   Strength   Strength Comments not formally tested, pt able to demo at least antigravity strength right quad and ankle, easily moves bilateral UE and left LE against gravity.    Bed Mobility   Bed Mobility Comments min A with right LE supine to sit with elevated HOB and railing    Transfer Skills   Transfer Comments sit to stand with min A and FWW   Gait   Gait Comments amb 10 ft with FWW step to pattern and FWW, antalgic gait pattern   Balance   Balance Comments min A with standing balance with bilateral UE support on the walker   Coordination   Coordination no deficits were identified   Muscle Tone   Muscle Tone no deficits were identified   Modality Interventions   Planned Modality Interventions Cryotherapy   General Therapy Interventions   Planned Therapy Interventions balance training;bed mobility training;gait training;strengthening;transfer training;progressive activity/exercise;home program guidelines;risk factor education   Clinical Impression   Criteria for Skilled Therapeutic Intervention yes, treatment indicated   PT Diagnosis impaired gait   Influenced by the following impairments impaired strength, impaired activity tolerance, pain   Functional limitations due to impairments impaired IND with functional mobility   Clinical Presentation Stable/Uncomplicated   Clinical Presentation Rationale based on medical status   Clinical Decision Making (Complexity) Low complexity   Therapy Frequency Daily   Predicted Duration of Therapy Intervention (days/wks) 4 days   Anticipated  "Discharge Disposition Other (see comments)  (defer to ortho MD for recommendations)   Risk & Benefits of therapy have been explained Yes   Patient, Family & other staff in agreement with plan of care Yes   [facility] TM \"6 Clicks\"   2016, Trustees of [facility], under license to [employer].  All rights reserved.   6 Clicks Short Forms Basic Mobility Inpatient Short Form   [facility] AM-PAC  \"6 Clicks\" V.2 Basic Mobility Inpatient Short Form   1. Turning from your back to your side while in a flat bed without using bedrails? 3 - A Little   2. Moving from lying on your back to sitting on the side of a flat bed without using bedrails? 3 - A Little   3. Moving to and from a bed to a chair (including a wheelchair)? 3 - A Little   4. Standing up from a chair using your arms (e.g., wheelchair, or bedside chair)? 3 - A Little   5. To walk in hospital room? 3 - A Little   6. Climbing 3-5 steps with a railing? 3 - A Little   Basic Mobility Raw Score (Score out of 24.Lower scores equate to lower levels of function) 18   Total Evaluation Time   Total Evaluation Time (Minutes) 15     "

## 2019-12-13 NOTE — CONSULTS
[facility]    Infectious Disease Consultation     Date of Admission:  12/12/2019  Date of Consult (When I saw the patient): 12/13/19    Assessment & Plan   Liborio Noguera is a 59 year old male who was admitted on 12/12/2019.     Impression:  1. 59 y.o male with recent history of right total hip arthroplasty done about 4.5 months ago.   2. Presenting with cellulitis with fluctuance over the right hip concerning for right CAMILLA infection.   3. S/P: Irrigation and debridement of right total hip arthroplasty with exchange of the polyethylene liner of the acetabulum and the femoral head followed by abductor repair.   4. Cultures pending.  5. Given ancef around surgery, a day of keflex prior to the admission.     Recommendations:   Continue on Ancef.   Follow-up on the cultures from the OR  Will need PICC but wait for blood culture to be negative for 72 hours before placing picc.               Barron Pruett MD    Reason for Consult   Reason for consult: I was asked by Dr. Montez  to evaluate this patient for Right total hip arthroplasty.     Primary Care Physician   FABY AUSTIN    Chief Complaint   Right total hip arthroplasty     History is obtained from the patient and medical records    History of Present Illness   Liborio Noguera is a 59 year old male who is 4-1/2 months out from uncomplicated right total hip arthroplasty.  Was doing well this a few days ago when seen in a clinic with elevated white count and a rash or a possible cellulitis involving the buttock area.  Seen in the ortho clinic and found to have fluctuance in his buttock area.    Area aspirated with bloody fluid, is s/p Irrigation and debridement of right total hip arthroplasty with exchange of the polyethylene liner of the acetabulum and the femoral head followed by abductor repair.          Past Medical History   I have reviewed this patient's medical history and updated it with pertinent information if needed.   Past  Medical History:   Diagnosis Date     Alcohol abuse      Allergic rhinitis      Anxiety and depression      Asthma      Diabetes (H)      Gout      HTN (hypertension)      Hyperlipidemia      Hyperparathyroidism (H)      Spinal stenosis      Vitiligo        Past Surgical History   I have reviewed this patient's surgical history and updated it with pertinent information if needed.  Past Surgical History:   Procedure Laterality Date     ARTHROPLASTY HIP Right 8/28/2019    Procedure: RIGHT TOTAL HIP REPLACEMENT;  Surgeon: Lis Montez MD;  Location: [facility] OR     COLONOSCOPY       ENT SURGERY      tonsillectomy     HEMILAMINECTOMY, DISCECTOMY LUMBAR ONE LEVEL, COMBINED Bilateral 5/15/2019    Procedure: LUMBAR 4-5 LAMINECTOMY/DISCECTOMY LUMBAR;  Surgeon: Reza Ya MD;  Location: [facility] OR     HERNIA REPAIR  2008     ORTHOPEDIC SURGERY Right 2008    knee scope     parathyroid exploration Left      testicle removal Right      vitiligo         Prior to Admission Medications   Prior to Admission Medications   Prescriptions Last Dose Informant Patient Reported? Taking?   DULoxetine (CYMBALTA) 60 MG capsule 12/12/2019 at Unknown time Self Yes Yes   Sig: Take 60 mg by mouth daily    acetaminophen (TYLENOL) 500 MG tablet  Self Yes Yes   Sig: Take 1,000 mg by mouth 2 times daily as needed for mild pain    albuterol (VENTOLIN HFA) 108 (90 Base) MCG/ACT inhaler  Self Yes Yes   Sig: Inhale 2 puffs into the lungs every 6 hours as needed    amLODIPine (NORVASC) 5 MG tablet 12/12/2019 at Unknown time Self Yes Yes   Sig: Take 5 mg by mouth daily    atorvastatin (LIPITOR) 20 MG tablet 12/12/2019 at Unknown time Self Yes Yes   Sig: Take 20 mg by mouth daily   cephALEXin (KEFLEX) 500 MG capsule 12/12/2019 at am  Yes Yes   Sig: Take 500 mg by mouth 4 times daily   diphenhydrAMINE-acetaminophen (TYLENOL PM)  MG tablet   Yes Yes   Sig: Take 2 tablets by mouth nightly as needed for sleep   fluticasone (FLONASE) 50 MCG/ACT nasal  spray  Self Yes Yes   Sig: Spray 1 spray in nostril daily as needed    fluticasone-salmeterol (ADVAIR DISKUS) 250-50 MCG/DOSE inhaler 12/12/2019 at Unknown time Self Yes Yes   Sig: Inhale 1 puff into the lungs every morning Prescribed BID; pt takes daily.   losartan-hydrochlorothiazide (HYZAAR) 100-25 MG tablet 12/12/2019 at Unknown time Self Yes Yes   Sig: Take 1 tablet by mouth daily    metFORMIN (GLUCOPHAGE) 500 MG tablet 12/12/2019 at Unknown time Self Yes Yes   Sig: Take 500 mg by mouth daily    montelukast (SINGULAIR) 10 MG tablet 12/12/2019 at Unknown time Self Yes Yes   Sig: Take 10 mg by mouth daily    sertraline (ZOLOFT) 50 MG tablet 12/12/2019 at Unknown time Self Yes Yes   Sig: Take 50 mg by mouth daily       Facility-Administered Medications: None     Allergies   Allergies   Allergen Reactions     Ace Inhibitors Cough       Immunization History   Immunization History   Administered Date(s) Administered     Influenza (H1N1) 01/14/2010     Influenza (IIV3) PF 11/07/2007, 12/03/2008     Influenza Vaccine, 3 YRS +, IM (QUADRIVALENT W/PRESERVATIVES) 11/04/2016, 10/10/2018     Td (Adult), Adsorbed 01/01/1991, 07/07/2002     Tdap (Adult) Unspecified Formulation 03/25/2013       Social History   I have reviewed this patient's social history and updated it with pertinent information if needed. Liborio Noguera  reports that he has never smoked. He has never used smokeless tobacco. He reports current alcohol use.    Family History   I have reviewed this patient's family history and updated it with pertinent information if needed.   No family history on file.    Review of Systems   The 10 point Review of Systems is negative other than noted in the HPI or here.     Physical Exam   Temp: 98.4  F (36.9  C) Temp src: Oral BP: 101/60 Pulse: 70   Resp: 16 SpO2: 96 % O2 Device: None (Room air) Oxygen Delivery: 1 LPM  Vital Signs with Ranges  Temp:  [97.9  F (36.6  C)-100.5  F (38.1  C)] 98.4  F (36.9  C)  Pulse:   [63-98] 70  Resp:  [14-18] 16  BP: ()/(58-85) 101/60  SpO2:  [90 %-100 %] 96 %  199 lbs 0 oz  Body mass index is 31.17 kg/m .    GENERAL APPEARANCE:  alert and no distress  EYES: Eyes grossly normal to inspection, PERRL and conjunctivae and sclerae normal  HENT: ear canals and TM's normal and nose and mouth without ulcers or lesions  NECK: no adenopathy, no asymmetry, masses, or scars and thyroid normal to palpation  RESP: lungs clear to auscultation - no rales, rhonchi or wheezes  CV: regular rates and rhythm, normal S1 S2, no S3 or S4 and no murmur, click or rub  LYMPHATICS: normal ant/post cervical and supraclavicular nodes  ABDOMEN: soft, nontender, without hepatosplenomegaly or masses and bowel sounds normal  MS: extremities normal- no gross deformities noted  SKIN: no suspicious lesions or rashes      Data   Lab Results   Component Value Date    WBC 14.2 (H) 12/12/2019    HGB 8.2 (L) 12/13/2019    HCT 31.1 (L) 12/12/2019     12/12/2019     12/13/2019    POTASSIUM 3.5 12/13/2019    POTASSIUM Canceled, Test credited 12/13/2019    CHLORIDE 105 12/13/2019    CO2 25 12/13/2019    BUN 21 12/13/2019    CR 1.11 12/13/2019     (H) 12/13/2019    SED 57 (H) 12/12/2019     Recent Labs   Lab 12/12/19  1545 12/12/19  1540 12/12/19  1335   CULT No growth after 12 hours No growth after 12 hours PENDING     Recent Labs   Lab Test 12/12/19  1545 12/12/19  1540 12/12/19  1335   CULT No growth after 12 hours No growth after 12 hours PENDING       Amount of time performed on this consult: 45 minutes. This includes face to face assessment and care coordination with the primary team.

## 2019-12-13 NOTE — PROVIDER NOTIFICATION
Provider notified regarding IV fluid orders and K+ replacement. Afternoon K+ 3.1 and not replaced prior to surgery. Awaiting call back.    Hospitalist paged again. Awaiting call back.

## 2019-12-13 NOTE — PROGRESS NOTES
S: Pt was seen today at 524-01 for fitting of a RIGHT hip abduction brace as ordered.  DX:  Abductor tendon tear.    O:  The patient is laying in bed alert and was fit with a [employer] hip brace.    A: At this time, I have fit patient with an off-the-shelf hip abduction brace.  A size large hip section was used and size med  thigh section was used.  No Abduction requested so the hip joint has been left a 0.  No ROM restrictions were requested so the hip joint was left at free motion for flexion/extension.  Patient was instructed with proper donning/doffing, use and cleaning.  The brace is fitting the patient appropriately at this time.      P: Patient has been instructed to contact our facility with any future questions and/or concerns.      G:  Support R hip, provide M-L stability.    Kyree CAMPBELL

## 2019-12-13 NOTE — ANESTHESIA CARE TRANSFER NOTE
Patient: Liborio Noguera    Procedure(s):  IRRIGATION AND DEBRIDEMENT RIGHT HIP WITH HEAD AND POLY EXCHANGE AND  ABDUCTOR REPAIR    Diagnosis: * No pre-op diagnosis entered *  Diagnosis Additional Information: No value filed.    Anesthesia Type:   General     Note:  Airway :Face Mask  Patient transferred to:PACU  Handoff Report: Identifed the Patient, Identified the Reponsible Provider, Reviewed the pertinent medical history, Discussed the surgical course, Reviewed Intra-OP anesthesia mangement and issues during anesthesia, Set expectations for post-procedure period and Allowed opportunity for questions and acknowledgement of understanding      Vitals: (Last set prior to Anesthesia Care Transfer)    CRNA VITALS  12/12/2019 2003 - 12/12/2019 2033 12/12/2019             Pulse:  105    SpO2:  96 %    Resp Rate (observed):  11    Resp Rate (set):  10                Electronically Signed By: JOEL Andrew CRNA  December 12, 2019  8:33 PM

## 2019-12-13 NOTE — PLAN OF CARE
Patient plan: home with spouse  Current status: PT eval and treatment initiated. 58y/o M POD #1 I and D right hip with head and poly exchange and abductor repair. Orders for anterio-lateral precautions, hip abductor brace and WBAT. Hx: R CAMILLA 08/29/2019. PLOF: pt reports was moving IND and back to work at [employer]-on his feet 9 hours/day. Lives with his wife with 3 AIRAM with bilateral railings.     Pt with right hip abduction brace on. Reviewed WBAT and hip precautions with pt with cues throughout encounter. Min A supine to sit, min A sit to stand with FWW, min A with amb then able to progress to SBA 150ft. Reports 5/10 left hip pain. Pt tolerated session well and denied lightheadedness.     Anticipated status at discharge: SBA with transfers, ambulation with FWW and min A with stairs.

## 2019-12-13 NOTE — ANESTHESIA POSTPROCEDURE EVALUATION
Patient: Liborio Noguera    Procedure(s):  IRRIGATION AND DEBRIDEMENT RIGHT HIP WITH HEAD AND POLY EXCHANGE AND  ABDUCTOR REPAIR    Diagnosis:* No pre-op diagnosis entered *  Diagnosis Additional Information: No value filed.    Anesthesia Type:  General    Note:  Anesthesia Post Evaluation    Patient location during evaluation: PACU  Patient participation: Able to fully participate in evaluation  Level of consciousness: awake  Pain management: adequate  Airway patency: patent  Cardiovascular status: acceptable  Respiratory status: acceptable  Hydration status: acceptable  PONV: none     Anesthetic complications: None          Last vitals:  Vitals:    12/12/19 2050 12/12/19 2100 12/12/19 2110   BP: 118/70 129/77 114/69   Pulse: 86 82 74   Resp: 18 16 14   Temp:  37.1  C (98.7  F) 37.1  C (98.8  F)   SpO2: 99%  98%         Electronically Signed By: Tristen Fu MD  December 12, 2019  9:29 PM

## 2019-12-14 ENCOUNTER — APPOINTMENT (OUTPATIENT)
Dept: PHYSICAL THERAPY | Facility: CLINIC | Age: 59
DRG: 466 | End: 2019-12-14
Attending: ORTHOPAEDIC SURGERY
Payer: COMMERCIAL

## 2019-12-14 PROBLEM — D63.8 ANEMIA OF CHRONIC DISORDER: Status: ACTIVE | Noted: 2019-12-14

## 2019-12-14 PROBLEM — F10.20 ALCOHOL USE DISORDER, MODERATE, DEPENDENCE (H): Status: ACTIVE | Noted: 2019-12-14

## 2019-12-14 PROBLEM — D64.9 ANEMIA: Status: RESOLVED | Noted: 2019-12-14 | Resolved: 2019-12-14

## 2019-12-14 PROBLEM — E11.9 TYPE 2 DIABETES MELLITUS (H): Status: ACTIVE | Noted: 2019-12-14

## 2019-12-14 PROBLEM — I10 BENIGN ESSENTIAL HYPERTENSION: Status: ACTIVE | Noted: 2019-12-14

## 2019-12-14 PROBLEM — D64.9 ANEMIA: Status: ACTIVE | Noted: 2019-12-14

## 2019-12-14 PROBLEM — D62 ACUTE BLOOD LOSS ANEMIA: Status: ACTIVE | Noted: 2019-12-14

## 2019-12-14 LAB
GLUCOSE BLDC GLUCOMTR-MCNC: 117 MG/DL (ref 70–99)
GLUCOSE BLDC GLUCOMTR-MCNC: 124 MG/DL (ref 70–99)
GLUCOSE BLDC GLUCOMTR-MCNC: 94 MG/DL (ref 70–99)
GLUCOSE BLDC GLUCOMTR-MCNC: 96 MG/DL (ref 70–99)
GLUCOSE SERPL-MCNC: 111 MG/DL (ref 70–99)
HGB BLD-MCNC: 8.2 G/DL (ref 13.3–17.7)
INTERPRETATION ECG - MUSE: NORMAL

## 2019-12-14 PROCEDURE — 97530 THERAPEUTIC ACTIVITIES: CPT | Mod: GP | Performed by: PHYSICAL THERAPIST

## 2019-12-14 PROCEDURE — 99232 SBSQ HOSP IP/OBS MODERATE 35: CPT | Performed by: INTERNAL MEDICINE

## 2019-12-14 PROCEDURE — 85018 HEMOGLOBIN: CPT | Performed by: PHYSICIAN ASSISTANT

## 2019-12-14 PROCEDURE — 25000132 ZZH RX MED GY IP 250 OP 250 PS 637: Performed by: PHYSICIAN ASSISTANT

## 2019-12-14 PROCEDURE — 25000128 H RX IP 250 OP 636: Performed by: INTERNAL MEDICINE

## 2019-12-14 PROCEDURE — 12000000 ZZH R&B MED SURG/OB

## 2019-12-14 PROCEDURE — 00000146 ZZHCL STATISTIC GLUCOSE BY METER IP

## 2019-12-14 PROCEDURE — 36415 COLL VENOUS BLD VENIPUNCTURE: CPT | Performed by: PHYSICIAN ASSISTANT

## 2019-12-14 PROCEDURE — 97116 GAIT TRAINING THERAPY: CPT | Mod: GP | Performed by: PHYSICAL THERAPIST

## 2019-12-14 PROCEDURE — 82947 ASSAY GLUCOSE BLOOD QUANT: CPT | Performed by: PHYSICIAN ASSISTANT

## 2019-12-14 PROCEDURE — 25000132 ZZH RX MED GY IP 250 OP 250 PS 637: Performed by: INTERNAL MEDICINE

## 2019-12-14 RX ADMIN — SERTRALINE HYDROCHLORIDE 50 MG: 50 TABLET ORAL at 08:08

## 2019-12-14 RX ADMIN — CEFAZOLIN SODIUM 2 G: 2 INJECTION, SOLUTION INTRAVENOUS at 00:03

## 2019-12-14 RX ADMIN — ASPIRIN 325 MG: 325 TABLET, DELAYED RELEASE ORAL at 08:09

## 2019-12-14 RX ADMIN — CEFAZOLIN SODIUM 2 G: 2 INJECTION, SOLUTION INTRAVENOUS at 16:31

## 2019-12-14 RX ADMIN — ATORVASTATIN CALCIUM 20 MG: 20 TABLET, FILM COATED ORAL at 08:09

## 2019-12-14 RX ADMIN — ACETAMINOPHEN 975 MG: 325 TABLET, FILM COATED ORAL at 22:12

## 2019-12-14 RX ADMIN — ACETAMINOPHEN 975 MG: 325 TABLET, FILM COATED ORAL at 06:17

## 2019-12-14 RX ADMIN — SENNOSIDES AND DOCUSATE SODIUM 1 TABLET: 8.6; 5 TABLET ORAL at 08:08

## 2019-12-14 RX ADMIN — MONTELUKAST 10 MG: 10 TABLET, FILM COATED ORAL at 08:09

## 2019-12-14 RX ADMIN — DULOXETINE HYDROCHLORIDE 60 MG: 30 CAPSULE, DELAYED RELEASE ORAL at 08:09

## 2019-12-14 RX ADMIN — ACETAMINOPHEN 975 MG: 325 TABLET, FILM COATED ORAL at 16:35

## 2019-12-14 RX ADMIN — CEFAZOLIN SODIUM 2 G: 2 INJECTION, SOLUTION INTRAVENOUS at 08:08

## 2019-12-14 RX ADMIN — AMLODIPINE BESYLATE 5 MG: 5 TABLET ORAL at 08:08

## 2019-12-14 ASSESSMENT — ACTIVITIES OF DAILY LIVING (ADL)
ADLS_ACUITY_SCORE: 14

## 2019-12-14 NOTE — PROVIDER NOTIFICATION
called about discharge order that was placed for patient - called Cornelio about the discharge order and Cornelio said it was fine to leave in.

## 2019-12-14 NOTE — PLAN OF CARE
[id number]:  Pt A&Ox4. VSS on RA. Dressing C/D/I, hip brace in place. CMS intact. Pain controlled  with scheduled Tylenol.  Up with A-1 gb and walker. Voiding adequately in urinal.  Regular diet - blood sugar checks. Discharge plan pending pt progress. Will continue to observe.

## 2019-12-14 NOTE — PLAN OF CARE
Patient plan: Home  Current status: Pt reports minimal pain. Abductor brace donned throughout. Pt transfers supine<>sit Mod IND. Pt transfers sit<>stand to FWW with cues for precautions. Pt ambulated 150'x2 with SBA. Pt able to ascend/descend 3 stairs with B rails and SBA. Encouraged pt to perform exercises and ambulation throughout the day.  Anticipated status at discharge: IND with bed mobility, transfers, gait with FWW; SBA on stairs

## 2019-12-14 NOTE — DISCHARGE SUMMARY
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)  Discharge Diagnosis: right hip infection  Procedures Performed: I&D right total hip replacement  Complications: None apparent   History of Present Illness: see operative report for full HPI  Past Medical History:   Past Medical History:   Diagnosis Date     Alcohol abuse      Allergic rhinitis      Anxiety and depression      Asthma      Diabetes (H)      Gout      HTN (hypertension)      Hyperlipidemia      Hyperparathyroidism (H)      Spinal stenosis      Vitiligo      Patient Active Problem List   Diagnosis     H/O total hip arthroplasty     Infected Right CAMILLA     Benign essential hypertension     Type 2 diabetes mellitus (H)     Alcohol use disorder, moderate, dependence (H)     Anemia     Past Surgical History:   Procedure Laterality Date     ARTHROPLASTY HIP Right 8/28/2019    Procedure: RIGHT TOTAL HIP REPLACEMENT;  Surgeon: Lis Montez MD;  Location: [facility] OR     ARTHROPLASTY REVISION HIP Right 12/12/2019    Procedure: IRRIGATION AND DEBRIDEMENT RIGHT HIP WITH HEAD AND POLY EXCHANGE AND  ABDUCTOR REPAIR;  Surgeon: Lis Montez MD;  Location: [facility] OR     COLONOSCOPY       ENT SURGERY      tonsillectomy     HEMILAMINECTOMY, DISCECTOMY LUMBAR ONE LEVEL, COMBINED Bilateral 5/15/2019    Procedure: LUMBAR 4-5 LAMINECTOMY/DISCECTOMY LUMBAR;  Surgeon: Reza Ya MD;  Location: [facility] OR     HERNIA REPAIR  2008     ORTHOPEDIC SURGERY Right 2008    knee scope     parathyroid exploration Left      testicle removal Right      vitiligo       Social History     Socioeconomic History     Marital status:      Spouse name: Not on file     Number of children: Not on file     Years of education: Not on file     Highest education level: Not  on file   Occupational History     Not on file   Social Needs     Financial resource strain: Not on file     Food insecurity:     Worry: Not on file     Inability: Not on file     Transportation needs:     Medical: Not on file     Non-medical: Not on file   Tobacco Use     Smoking status: Never Smoker     Smokeless tobacco: Never Used   Substance and Sexual Activity     Alcohol use: Yes     Comment: 1 can of beer a week     Drug use: Not on file     Sexual activity: Not on file   Lifestyle     Physical activity:     Days per week: Not on file     Minutes per session: Not on file     Stress: Not on file   Relationships     Social connections:     Talks on phone: Not on file     Gets together: Not on file     Attends [employer] service: Not on file     Active member of club or organization: Not on file     Attends meetings of clubs or organizations: Not on file     Relationship status: Not on file     Intimate partner violence:     Fear of current or ex partner: Not on file     Emotionally abused: Not on file     Physically abused: Not on file     Forced sexual activity: Not on file   Other Topics Concern     Parent/sibling w/ CABG, MI or angioplasty before 65F 55M? Not Asked   Social History Narrative     Not on file     Medications on admission:   Medications Prior to Admission   Medication Sig Dispense Refill Last Dose     acetaminophen (TYLENOL) 500 MG tablet Take 1,000 mg by mouth 2 times daily as needed for mild pain    8/27/2019 at prn     albuterol (VENTOLIN HFA) 108 (90 Base) MCG/ACT inhaler Inhale 2 puffs into the lungs every 6 hours as needed    8/28/2019 at 0945     amLODIPine (NORVASC) 5 MG tablet Take 5 mg by mouth daily    12/12/2019 at Unknown time     atorvastatin (LIPITOR) 20 MG tablet Take 20 mg by mouth daily   12/12/2019 at Unknown time     cephALEXin (KEFLEX) 500 MG capsule Take 500 mg by mouth 4 times daily   12/12/2019 at am     diphenhydrAMINE-acetaminophen (TYLENOL PM)  MG tablet Take 2  tablets by mouth nightly as needed for sleep        DULoxetine (CYMBALTA) 60 MG capsule Take 60 mg by mouth daily   3 12/12/2019 at Unknown time     fluticasone (FLONASE) 50 MCG/ACT nasal spray Spray 1 spray in nostril daily as needed    8/28/2019 at 0600     fluticasone-salmeterol (ADVAIR DISKUS) 250-50 MCG/DOSE inhaler Inhale 1 puff into the lungs every morning Prescribed BID; pt takes daily.   12/12/2019 at Unknown time     losartan-hydrochlorothiazide (HYZAAR) 100-25 MG tablet Take 1 tablet by mouth daily   3 12/12/2019 at Unknown time     metFORMIN (GLUCOPHAGE) 500 MG tablet Take 500 mg by mouth daily    12/12/2019 at Unknown time     montelukast (SINGULAIR) 10 MG tablet Take 10 mg by mouth daily    12/12/2019 at Unknown time     sertraline (ZOLOFT) 50 MG tablet Take 50 mg by mouth daily    12/12/2019 at Unknown time     Allergies:    Allergies   Allergen Reactions     Ace Inhibitors Cough       Hospital Course: Patient was admitted to Orthopedics and brought to the OR on  where he underwent the above named procedure. Postoperatively he did very well with no apparent complications, and he had an uneventful hospital stay. Antibiotics was given after surgery. He was given aspirin for DVT prophylaxis and his pain was well controlled with oral meds, then transitioned to oral pain medications during his hospital stay. He progressed well with physical therapy and discharge to home was recommended. His chronic medical problems were followed by the medicine team during his hospital stay and there were no significant changes to conditions or treatment plans. No new medical problems presented during his hospital stay.   Consultations Obtained During Hospitalization:  1. Internal medicine for management of comorbid conditions and home medication management.  2. Physical Therapy for gait training, mobilization, range of motion and strengthening exercises  3. Occupational Therapy for activities of daily living.  4. Social  Work for disposition planning.  5. Infectious Disease  Principal Problem:    Infected Right CAMILLA  Active Problems:    Benign essential hypertension    Type 2 diabetes mellitus (H)    Alcohol use disorder, moderate, dependence (H)    Anemia    Discharge Disposition: patient improving  Discharge Diet: resume normal pre op diet  Discharge Medications:   Current Discharge Medication List      START taking these medications    Details   aspirin (ASA) 325 MG EC tablet Take 1 tablet (325 mg) by mouth daily  Qty: 30 tablet, Refills: 0    Associated Diagnoses: Infection of prosthetic total hip joint, initial encounter (H)      hydrOXYzine (ATARAX) 25 MG tablet Take 1 tablet (25 mg) by mouth every 6 hours as needed for other (adjuvant pain)  Qty: 30 tablet, Refills: 0    Associated Diagnoses: Infection of prosthetic total hip joint, initial encounter (H)      ondansetron (ZOFRAN-ODT) 4 MG ODT tab Take 1 tablet (4 mg) by mouth every 6 hours as needed for nausea or vomiting  Qty: 30 tablet, Refills: 0    Associated Diagnoses: Infection of prosthetic total hip joint, initial encounter (H)      oxyCODONE (ROXICODONE) 5 MG tablet Take 1-2 tablets (5-10 mg) by mouth every 3 hours as needed for breakthrough pain  Qty: 30 tablet, Refills: 0    Associated Diagnoses: Infection of prosthetic total hip joint, initial encounter (H)      senna-docusate (SENOKOT-S/PERICOLACE) 8.6-50 MG tablet Take 2 tablets by mouth 2 times daily  Qty: 30 tablet, Refills: 0    Associated Diagnoses: Infection of prosthetic total hip joint, initial encounter (H)         CONTINUE these medications which have NOT CHANGED    Details   acetaminophen (TYLENOL) 500 MG tablet Take 1,000 mg by mouth 2 times daily as needed for mild pain       albuterol (VENTOLIN HFA) 108 (90 Base) MCG/ACT inhaler Inhale 2 puffs into the lungs every 6 hours as needed       amLODIPine (NORVASC) 5 MG tablet Take 5 mg by mouth daily       atorvastatin (LIPITOR) 20 MG tablet Take 20 mg by  mouth daily      cephALEXin (KEFLEX) 500 MG capsule Take 500 mg by mouth 4 times daily      diphenhydrAMINE-acetaminophen (TYLENOL PM)  MG tablet Take 2 tablets by mouth nightly as needed for sleep      DULoxetine (CYMBALTA) 60 MG capsule Take 60 mg by mouth daily   Refills: 3      fluticasone (FLONASE) 50 MCG/ACT nasal spray Spray 1 spray in nostril daily as needed       fluticasone-salmeterol (ADVAIR DISKUS) 250-50 MCG/DOSE inhaler Inhale 1 puff into the lungs every morning Prescribed BID; pt takes daily.      losartan-hydrochlorothiazide (HYZAAR) 100-25 MG tablet Take 1 tablet by mouth daily   Refills: 3      metFORMIN (GLUCOPHAGE) 500 MG tablet Take 500 mg by mouth daily       montelukast (SINGULAIR) 10 MG tablet Take 10 mg by mouth daily       sertraline (ZOLOFT) 50 MG tablet Take 50 mg by mouth daily            Code Status:   X-rays needed at the follow up visit:   Camacho Carlisle PA-C  12/14/2019 8:07 AM   [facility]  [phone]    Hip abduction brace for 8 weeks.

## 2019-12-14 NOTE — PLAN OF CARE
Pt a&o x4, vss on ra, up with 1, denies pain - taking scheduled tylenol, reg diet - blood sugar checks, cms intact, dressing cdi, hip brace in place, waiting for cultures to come back for picc placement and discharge planning with ID.

## 2019-12-14 NOTE — PROGRESS NOTES
[facility]    Hospitalist Progress Note    Assessment & Plan   59 year old male who was admitted on 12/12/2019 with possible infected right CAMILLA     Impression:   Principal Problem:    Possible Infected Right CAMILLA -- S/P open lavage yesterday   -- cultures with no growth to date   -- On IV ancef     Active Problems:    Benign essential hypertension      Type 2 diabetes mellitus (H)      Alcohol use disorder       Anemia of chronic disorder      Acute blood loss anemia      Plan:  CBC in AM, await final on Blood and joint cultures, anticipate PICC tomorrow if cultures negative and possibly home with IV Ancef.     DVT Prophylaxis: Pneumatic Compression Devices  Code Status: No Order    Disposition: Expected discharge possibly tomorrow.     Abdirizak Aponte MD  Pager [phone]  Cell Phone [phone]  Text Page (7am to 6pm)    Interval History   Feels OK, rates his pain 4 out of 10, using Tylenol only for pain.     Physical Exam   Temp: 98.1  F (36.7  C) Temp src: Oral BP: 118/71 Pulse: 66 Heart Rate: 62 Resp: 16 SpO2: 97 % O2 Device: None (Room air)    Vitals:    12/12/19 1659   Weight: 90.3 kg (199 lb)     Vital Signs with Ranges  Temp:  [98.1  F (36.7  C)-98.8  F (37.1  C)] 98.1  F (36.7  C)  Pulse:  [66] 66  Heart Rate:  [62-75] 62  Resp:  [16] 16  BP: (106-118)/(62-71) 118/71  SpO2:  [96 %-97 %] 97 %  I/O last 3 completed shifts:  In: -   Out: 2700 [Urine:2700]    # Pain Assessment:  Current Pain Score 12/14/2019   Patient currently in pain? yes   Pain score (0-10) 4   - Liborio is experiencing pain due to hip surgery. Pain management was discussed and the plan was created in a collaborative fashion.  Liborio's response to the current recommendations: engaged  - Please see the plan for pain management as documented above    Constitutional: Awake, alert, cooperative, no apparent distress  Respiratory: Clear to auscultation bilaterally, no crackles or wheezing  Cardiovascular: Regular rate  and rhythm, normal S1 and S2, and no murmur noted  GI: Normal bowel sounds, soft, non-distended, non-tender  Extrem: No calf tenderness, no ankle edema  Neuro: Ox3, no focal motor or sensory deficits    Medications       acetaminophen  975 mg Oral Q8H ESTEFANIA     amLODIPine  5 mg Oral Daily     aspirin  325 mg Oral Daily     atorvastatin  20 mg Oral Daily     ceFAZolin  2 g Intravenous Q8H     DULoxetine  60 mg Oral Daily     fluticasone-vilanterol  1 puff Inhalation Daily     insulin aspart  1-7 Units Subcutaneous TID AC     insulin aspart  1-5 Units Subcutaneous At Bedtime     montelukast  10 mg Oral Daily     polyethylene glycol  17 g Oral Daily     senna-docusate  2 tablet Oral BID     sertraline  50 mg Oral Daily     sodium chloride (PF)  3 mL Intracatheter Q8H       Data   Recent Labs   Lab 12/14/19  0703 12/13/19  0503 12/12/19  1541   WBC  --   --  14.2*   HGB 8.2* 8.2* 10.0*   MCV  --   --  81   PLT  --   --  388   NA  --  135 132*   POTASSIUM  --  3.5  Canceled, Test credited 3.1*   CHLORIDE  --  105 104   CO2  --  25 24   BUN  --  21 27   CR  --  1.11 1.25   ANIONGAP  --  5 4   DOLORES  --  8.1* 8.8   * 112* 100*       Imaging:   No results found for this or any previous visit (from the past 24 hour(s)).

## 2019-12-14 NOTE — PROGRESS NOTES
"Liborio Noguera  2019  POD # 1 sp hip I and D.    Admit Date:  2019      Doing well.  Normal healing wound.  No immediate surgical complications identified.  No excessive bleeding  Pain well-controlled.  Objective:  Blood pressure 118/71, pulse 66, temperature 98.1  F (36.7  C), temperature source Oral, resp. rate 16, height 1.702 m (5' 7\"), weight 90.3 kg (199 lb), SpO2 97 %.    Temperatures:  Current - Temp: 98.4  F (36.9  C); Max - Temp  Av.5  F (36.9  C)  Min: 97.9  F (36.6  C)  Max: 100.5  F (38.1  C)  Pulse range: Pulse  Av.7  Min: 63  Max: 98  Blood pressure range: Systolic (24hrs), Av , Min:97 , Max:129   ; Diastolic (24hrs), Av, Min:58, Max:85    Exam:  CMS: intact  alert, stable, wound ok  Calf nontender b le.       Labs:  Recent Labs   Lab Test 19  0503 19  1541 19  0705   POTASSIUM 3.5  Canceled, Test credited 3.1* 3.6     Recent Labs   Lab Test 19  0503 19  1541 19  0705   HGB 8.2* 10.0* 9.2*     No results for input(s): INR in the last 36354 hours.  Recent Labs   Lab Test 19  1541          PLAN: Weight bearing as tolerated  Pain control measures  Additional problems to be followed by medicine physician  Abduction brace for 8 wks.   Infectious disease to see today and if cultures finalized will be able to place picc line.   Patient can discharge from ortho standpoint once complete.        "

## 2019-12-14 NOTE — PLAN OF CARE
CMS intact.  Dressing CDI.  Hip brace in place. Denies pain.  Up with 1 and walker.  Voiding adequate amount in urinal.

## 2019-12-14 NOTE — PLAN OF CARE
Pt doing well tonight, encouraged his own positioning, pt able to do his own mini turns.  Pt has brace on, taking tylenol for pain relief.  Pt is slowly progressing towards his goals.

## 2019-12-15 LAB
GLUCOSE BLDC GLUCOMTR-MCNC: 126 MG/DL (ref 70–99)
GLUCOSE BLDC GLUCOMTR-MCNC: 136 MG/DL (ref 70–99)
GLUCOSE BLDC GLUCOMTR-MCNC: 143 MG/DL (ref 70–99)
GLUCOSE BLDC GLUCOMTR-MCNC: 83 MG/DL (ref 70–99)
GLUCOSE BLDC GLUCOMTR-MCNC: 97 MG/DL (ref 70–99)

## 2019-12-15 PROCEDURE — 25000128 H RX IP 250 OP 636: Performed by: INTERNAL MEDICINE

## 2019-12-15 PROCEDURE — 25000132 ZZH RX MED GY IP 250 OP 250 PS 637: Performed by: PHYSICIAN ASSISTANT

## 2019-12-15 PROCEDURE — 99232 SBSQ HOSP IP/OBS MODERATE 35: CPT | Performed by: INTERNAL MEDICINE

## 2019-12-15 PROCEDURE — 25000132 ZZH RX MED GY IP 250 OP 250 PS 637: Performed by: INTERNAL MEDICINE

## 2019-12-15 PROCEDURE — 00000146 ZZHCL STATISTIC GLUCOSE BY METER IP

## 2019-12-15 PROCEDURE — 99207 ZZC CDG-CODE CATEGORY CHANGED: CPT | Performed by: INTERNAL MEDICINE

## 2019-12-15 PROCEDURE — 12000000 ZZH R&B MED SURG/OB

## 2019-12-15 RX ORDER — CEFAZOLIN SODIUM 2 G/100ML
2 INJECTION, SOLUTION INTRAVENOUS EVERY 8 HOURS
Refills: 0
Start: 2019-12-15 | End: 2019-12-16

## 2019-12-15 RX ORDER — HEPARIN SODIUM,PORCINE 10 UNIT/ML
2-5 VIAL (ML) INTRAVENOUS
Status: DISCONTINUED | OUTPATIENT
Start: 2019-12-15 | End: 2019-12-16 | Stop reason: HOSPADM

## 2019-12-15 RX ORDER — LIDOCAINE 40 MG/G
CREAM TOPICAL
Status: DISCONTINUED | OUTPATIENT
Start: 2019-12-15 | End: 2019-12-16 | Stop reason: HOSPADM

## 2019-12-15 RX ORDER — CALCIUM CARBONATE 500 MG/1
1 TABLET, CHEWABLE ORAL 2 TIMES DAILY
Start: 2019-12-15

## 2019-12-15 RX ORDER — CHOLECALCIFEROL (VITAMIN D3) 50 MCG
1 TABLET ORAL DAILY
Start: 2019-12-15

## 2019-12-15 RX ORDER — HEPARIN SODIUM,PORCINE 10 UNIT/ML
2-5 VIAL (ML) INTRAVENOUS
Refills: 0
Start: 2019-12-15

## 2019-12-15 RX ADMIN — CEFAZOLIN SODIUM 2 G: 2 INJECTION, SOLUTION INTRAVENOUS at 00:19

## 2019-12-15 RX ADMIN — FLUTICASONE FUROATE AND VILANTEROL TRIFENATATE 1 PUFF: 200; 25 POWDER RESPIRATORY (INHALATION) at 08:35

## 2019-12-15 RX ADMIN — ASPIRIN 325 MG: 325 TABLET, DELAYED RELEASE ORAL at 08:32

## 2019-12-15 RX ADMIN — ACETAMINOPHEN 975 MG: 325 TABLET, FILM COATED ORAL at 08:32

## 2019-12-15 RX ADMIN — DULOXETINE HYDROCHLORIDE 60 MG: 30 CAPSULE, DELAYED RELEASE ORAL at 08:32

## 2019-12-15 RX ADMIN — SERTRALINE HYDROCHLORIDE 50 MG: 50 TABLET ORAL at 08:32

## 2019-12-15 RX ADMIN — ACETAMINOPHEN 975 MG: 325 TABLET, FILM COATED ORAL at 16:20

## 2019-12-15 RX ADMIN — ATORVASTATIN CALCIUM 20 MG: 20 TABLET, FILM COATED ORAL at 08:32

## 2019-12-15 RX ADMIN — CEFAZOLIN SODIUM 2 G: 2 INJECTION, SOLUTION INTRAVENOUS at 08:32

## 2019-12-15 RX ADMIN — MONTELUKAST 10 MG: 10 TABLET, FILM COATED ORAL at 08:32

## 2019-12-15 RX ADMIN — AMLODIPINE BESYLATE 5 MG: 5 TABLET ORAL at 08:32

## 2019-12-15 RX ADMIN — CEFAZOLIN SODIUM 2 G: 2 INJECTION, SOLUTION INTRAVENOUS at 16:20

## 2019-12-15 ASSESSMENT — ACTIVITIES OF DAILY LIVING (ADL)
ADLS_ACUITY_SCORE: 14

## 2019-12-15 NOTE — PLAN OF CARE
PT:  Attempted to see during scheduled PT session.  Patient resting in bed; reporting he hopes to leave today and has no concerns.  Has WW, hiking poles, SEC and hip abductor brace. Is I'lly performing exercises in bed. Declined to currently get OOB so PT session cancelled; reviewed anterior hip precautions.  Nursing aware that patient declined at this time.

## 2019-12-15 NOTE — PROGRESS NOTES
Spoke with Dr Avila regarding line placement.  Hold on line for Dr. Pruett to make decision on abx choice.  Midline vs PICC. Updated IV team.

## 2019-12-15 NOTE — PROGRESS NOTES
"Liborio Noguera  2019  POD # 1 sp hip I and D.    Admit Date:  2019      Doing well.  Normal healing wound.  No immediate surgical complications identified.  No excessive bleeding  Pain well-controlled.  Objective:  Blood pressure 129/77, pulse 72, temperature 98.7  F (37.1  C), temperature source Oral, resp. rate 16, height 1.702 m (5' 7\"), weight 90.3 kg (199 lb), SpO2 96 %.    Temperatures:  Current - Temp: 98.4  F (36.9  C); Max - Temp  Av.5  F (36.9  C)  Min: 97.9  F (36.6  C)  Max: 100.5  F (38.1  C)  Pulse range: Pulse  Av.7  Min: 63  Max: 98  Blood pressure range: Systolic (24hrs), Av , Min:97 , Max:129   ; Diastolic (24hrs), Av, Min:58, Max:85    Exam:  CMS: intact  alert, stable, wound ok  Calf nontender b le.       Labs:  Recent Labs   Lab Test 19  0503 19  1541 19  0705   POTASSIUM 3.5  Canceled, Test credited 3.1* 3.6     Recent Labs   Lab Test 19  0503 19  1541 19  0705   HGB 8.2* 10.0* 9.2*     No results for input(s): INR in the last 74945 hours.  Recent Labs   Lab Test 19  1541          PLAN: Weight bearing as tolerated  Pain control measures  Additional problems to be followed by medicine physician  Abduction brace for 8 wks.   Infectious disease to see today and if cultures finalized will be able to place picc line.   Patient can discharge from ortho standpoint once complete.        "

## 2019-12-15 NOTE — DISCHARGE SUMMARY
[facility]    Discharge Summary  Hospitalist    Date of Admission:  12/12/2019  Date of Discharge:  12/15/2019  Discharging Provider: Abdirizak Aponte MD    Discharge Diagnoses   Principal Problem:    Infected Right CAMILLA  Active Problems:    Benign essential hypertension    Type 2 diabetes mellitus (H)    Alcohol use disorder, moderate     Anemia of chronic disorder    Acute blood loss anemia      History of Present Illness   59-year-old male with past medical history of non-insulin dependent type 2 diabetes, hypertension, hx of significant alcohol use, and dyslipidemia who was directly admitted from [facility]opedic [facility] for evaluation of infected right total hip arthroplasty.  The patient underwent a right total hip arthroplasty 08/28/2019.  His recovery was relatively uncomplicated and has done well since.  Two days ago he awoke with increasing right hip pain as well as some redness around his incision, seen in [facility] clinic, started on oral antibiotic and instructed to see his orthopedist and did come to the clinic 2 days later, seen by  Dr. Montez who noted it was obviously infected, and admitted for IV antibiotics and washout later the same day.        He notes he has been febrile up to 100.8 with some chills.  He has moderate alcohol use daily at home.  WBC 14.2, hgb 10.0, potassium 3.1, CRP markedly elevated at 241, Hgb A1C 6.0.       Hospital Course   Admitted to ortho floor, had joint wash out by Dr. Montez and the deep tissue was obviously infected, treated with IV ancef 2 gm q8, seen by Dr. Flynn Mart who recommended 6 weeks IV antibiotic therapy.     All blood and tissue cultures showed no growth, but probably related to being on antibiotics for 2 days prior to cultures being obtained.  Clinically doing well at time of discharge, using only tylenol for pain.  He was advised to avoid alcohol.  He was placed on alcohol withdrawal protocol but it was not needed.  He was  noted to have soft bones at time of the joint wash out, and improved nutrition would help with healing.  Also started daily Vit D and Tums bid for bone health.     Abdirizak Aponte MD, MD  Pager: [phone]  Cell Phone:  [phone]       Significant Results and Procedures   As above    Pending Results   These results will be followed up by Dr. Aponte  Unresulted Labs Ordered in the Past 30 Days of this Admission     Date and Time Order Name Status Description    12/12/2019 1452 Blood culture Preliminary     12/12/2019 1452 Blood culture Preliminary     12/12/2019 1335 Hospital - FLUID CULTURE AEROBIC BACTERIAL Preliminary     12/12/2019 1335 Hospital - ANAEROBIC BACTERIAL CULTURE Preliminary           Code Status   Full Code       Primary Care Physician   FABY AUSTIN    Physical Exam   Temp: 98.7  F (37.1  C) Temp src: Oral BP: 129/77 Pulse: 72 Heart Rate: 68 Resp: 16 SpO2: 96 % O2 Device: None (Room air)    Vitals:    12/12/19 1659   Weight: 90.3 kg (199 lb)     Vital Signs with Ranges  Temp:  [98.1  F (36.7  C)-99.1  F (37.3  C)] 98.7  F (37.1  C)  Pulse:  [72] 72  Heart Rate:  [65-89] 68  Resp:  [16] 16  BP: (107-129)/(60-78) 129/77  SpO2:  [96 %-97 %] 96 %  I/O last 3 completed shifts:  In: 240 [P.O.:240]  Out: 1775 [Urine:1775]    Exam on discharge:   Lungs clear  CV with reg S1S3  Right hip with dressing.     Discharge Disposition   Discharged to home with home infusion therapy  Condition at discharge: Good    Consultations This Hospital Stay   HOSPITALIST IP CONSULT  OCCUPATIONAL THERAPY ADULT IP CONSULT  PHYSICAL THERAPY ADULT IP CONSULT  INFECTIOUS DISEASES IP CONSULT  ORTHOSIS EXTREMITY LOWER REFERRAL IP CONSULT  INFECTIOUS DISEASES IP CONSULT  VASCULAR ACCESS ADULT IP CONSULT    Time Spent on this Encounter   I spent a total of 35 minutes discharging this patient.     Discharge Orders      Compression stockings     Home care nursing referral      Home infusion referral      Reason for your  hospital stay    Patient admitted status post I&D right total hip replacement     Discharge Instructions    Patient is to slowly progress back into their activities over the next several weeks.   They are to keep the Prineo(mesh) dressing in place at all times and do not remove it.   The island dressing can be removed. You can shower but try and keep the incision site covered and do not get it wet. Do not at any point in time, submerge the incision or soak the incision.   Monitor the wound closely for any foul smelling or abnormal discharge. Any temperatures over 101.5 with chills, or any redness about the incision please contact our office immediately. If you experience any chest pains, or shortness of breath, go directly to the emergency department.   Follow up with Dr. Montez in approximately 2 weeks and call if you have any complaints between now and your follow up.   Call [phone] to schedule your follow up if you do not already have one scheduled.   Maintain hip precautions with no hip abduction until we clear you to do so.   Make sure that you have someone with you when you get up and move around the house and make sure you use some form of ambulatory assistance at all times, preferably a walker.   Take aspirin for prevention of blood clots  Take oxycodone for pain control  Take senokot to help with constipation  Take atarax to help with spasms/itching/relaxation  Take zofran to help with nausea  Antibiotic selection IV per infectious disease team.   Hip abduction brace at all times for 8 weeks.   Keep picc line site clean and dry at all times.   Follow up with ID team as they instruct  MICKY stockings for three weeks post op.     Discharge Instructions    Call Dr. Woodson if any medical questions at Cell Phone [phone].     Activity    Your activity upon discharge: activity as tolerated     MD face to face encounter    Documentation of Face to Face and Certification for Home Health Services    I  certify that patient: Liborio Noguera is under my care and that I, or a nurse practitioner or physician's assistant working with me, had a face-to-face encounter that meets the physician face-to-face encounter requirements with this patient on: 12/15/2019.    This encounter with the patient was in whole, or in part, for the following medical condition, which is the primary reason for home health care: infected right CAMILLA.    I certify that, based on my findings, the following services are medically necessary home health services: Nursing.    My clinical findings support the need for the above services because: Nurse is needed: For complex aftercare of surgical procedures because the patient needs instruction and cannot perform care on their own due to: infected hip and need for IV antibiotics..    Further, I certify that my clinical findings support that this patient is homebound (i.e. absences from home require considerable and taxing effort and are for medical reasons or [employer] services or infrequently or of short duration when for other reasons) because: Leaving home is medically contraindicated for the following reason(s): Other physician ordered restriction: pain with ambulation related to infected hip...    Based on the above findings. I certify that this patient is confined to the home and needs intermittent skilled nursing care, physical therapy and/or speech therapy.  The patient is under my care, and I have initiated the establishment of the plan of care.  This patient will be followed by a physician who will periodically review the plan of care.  Physician/Provider to provide follow up care: Obed Medley    Attending hospital physician (the Medicare certified [facility] provider): Abdirizak Aponte MD  Physician Signature: See electronic signature associated with these discharge orders.  Date: 12/15/2019     Follow-up and recommended labs and tests     Follow up with Dr. Montez in 2 weeks,  and weekly CBC, BMP and CRP to be drawn by infusion nurse with results faxed to Dr. Flynn Mart, and follow-up with Dr. Mart in 3-4 weeks.     Diet    Follow this diet upon discharge: Orders Placed This Encounter      Advance Diet as Tolerated: Regular Diet Adult.  Avoid alcohol.     Discharge Medications   Current Discharge Medication List      START taking these medications    Details   aspirin (ASA) 325 MG EC tablet Take 1 tablet (325 mg) by mouth daily  Qty: 30 tablet, Refills: 0    Associated Diagnoses: Infection of prosthetic total hip joint, initial encounter (H)      calcium carbonate (TUMS) 500 MG chewable tablet Take 1 tablet (500 mg) by mouth 2 times daily    Comments: For healthy bones  Associated Diagnoses: Infection of prosthetic total hip joint, initial encounter (H)      ceFAZolin (ANCEF) intermittent infusion 2 g in 100 mL dextrose PRE-MIX Inject 100 mLs (2 g) into the vein every 8 hours  Refills: 0    Associated Diagnoses: Infection of prosthetic total hip joint, initial encounter (H)      heparin lock flush 10 UNIT/ML SOLN injection 2-5 mLs by Intracatheter route once as needed for line flush (for locking each dormant lumen with line placement)  Refills: 0    Associated Diagnoses: Infection of prosthetic total hip joint, initial encounter (H)      hydrOXYzine (ATARAX) 25 MG tablet Take 1 tablet (25 mg) by mouth every 6 hours as needed for other (adjuvant pain)  Qty: 30 tablet, Refills: 0    Associated Diagnoses: Infection of prosthetic total hip joint, initial encounter (H)      ondansetron (ZOFRAN-ODT) 4 MG ODT tab Take 1 tablet (4 mg) by mouth every 6 hours as needed for nausea or vomiting  Qty: 30 tablet, Refills: 0    Associated Diagnoses: Infection of prosthetic total hip joint, initial encounter (H)      oxyCODONE (ROXICODONE) 5 MG tablet Take 1-2 tablets (5-10 mg) by mouth every 3 hours as needed for breakthrough pain  Qty: 30 tablet, Refills: 0    Associated Diagnoses: Infection of  prosthetic total hip joint, initial encounter (H)      senna-docusate (SENOKOT-S/PERICOLACE) 8.6-50 MG tablet Take 2 tablets by mouth 2 times daily  Qty: 30 tablet, Refills: 0    Associated Diagnoses: Infection of prosthetic total hip joint, initial encounter (H)      sodium chloride, PF, 0.9% PF flush 5-50 mLs by Intracatheter route once as needed for line flush (to flush each lumen with line placement)    Associated Diagnoses: Infection of prosthetic total hip joint, initial encounter (H)      vitamin D3 (CHOLECALCIFEROL) 2000 units (50 mcg) tablet Take 1 tablet (2,000 Units) by mouth daily    Comments: For healthy bones  Associated Diagnoses: Infection of prosthetic total hip joint, initial encounter (H)         CONTINUE these medications which have NOT CHANGED    Details   acetaminophen (TYLENOL) 500 MG tablet Take 1,000 mg by mouth 2 times daily as needed for mild pain       albuterol (VENTOLIN HFA) 108 (90 Base) MCG/ACT inhaler Inhale 2 puffs into the lungs every 6 hours as needed       amLODIPine (NORVASC) 5 MG tablet Take 5 mg by mouth daily       atorvastatin (LIPITOR) 20 MG tablet Take 20 mg by mouth daily      cephALEXin (KEFLEX) 500 MG capsule Take 500 mg by mouth 4 times daily      diphenhydrAMINE-acetaminophen (TYLENOL PM)  MG tablet Take 2 tablets by mouth nightly as needed for sleep      DULoxetine (CYMBALTA) 60 MG capsule Take 60 mg by mouth daily   Refills: 3      fluticasone (FLONASE) 50 MCG/ACT nasal spray Spray 1 spray in nostril daily as needed       fluticasone-salmeterol (ADVAIR DISKUS) 250-50 MCG/DOSE inhaler Inhale 1 puff into the lungs every morning Prescribed BID; pt takes daily.      losartan-hydrochlorothiazide (HYZAAR) 100-25 MG tablet Take 1 tablet by mouth daily   Refills: 3      metFORMIN (GLUCOPHAGE) 500 MG tablet Take 500 mg by mouth daily       montelukast (SINGULAIR) 10 MG tablet Take 10 mg by mouth daily       sertraline (ZOLOFT) 50 MG tablet Take 50 mg by mouth daily             Allergies   Allergies   Allergen Reactions     Ace Inhibitors Cough     Data   Most Recent 3 CBC's:  Recent Labs   Lab Test 12/14/19  0703 12/13/19  0503 12/12/19  1541   WBC  --   --  14.2*   HGB 8.2* 8.2* 10.0*   MCV  --   --  81   PLT  --   --  388      Most Recent 3 BMP's:  Recent Labs   Lab Test 12/14/19  0703 12/13/19  0503 12/12/19  1541 08/29/19  0705   NA  --  135 132* 138   POTASSIUM  --  3.5  Canceled, Test credited 3.1* 3.6   CHLORIDE  --  105 104 105   CO2  --  25 24 27   BUN  --  21 27 18   CR  --  1.11 1.25 1.01   ANIONGAP  --  5 4 6   DOLORES  --  8.1* 8.8 8.3*   * 112* 100* 125*     Most Recent 2 LFT's:No lab results found.  Most Recent INR's and Anticoagulation Dosing History:  Anticoagulation Dose History     There is no flowsheet data to display.        Most Recent 3 Troponin's:No lab results found.  Most Recent Cholesterol Panel:  Recent Labs   Lab Test 04/03/19   CHOL 183   LDL 65   *   TRIG 54     Most Recent 6 Bacteria Isolates From Any Culture (See EPIC Reports for Culture Details):  Recent Labs   Lab Test 12/12/19  1545 12/12/19  1540 12/12/19  1335   CULT No growth after 3 days No growth after 3 days Culture negative monitoring continues  Culture negative monitoring continues     Most Recent TSH, T4 and A1c Labs:  Recent Labs   Lab Test 12/12/19  1541   A1C 6.0*

## 2019-12-15 NOTE — PLAN OF CARE
VSS. On room air. Dressing cdi, cms baseline. Brace on all the time. Waiting for cultures. Tylenol for pain

## 2019-12-15 NOTE — PLAN OF CARE
Pt declined getting up today.  Able to reposition self in bed.  Voiding.  Plan to discharge once abx plan established tomorrow.  Abd brace on AAT.

## 2019-12-15 NOTE — PLAN OF CARE
Pt A/O x4. CMS intact; Dressing CDI. VSS on RA. Ambulating well A1 ww; Voiding adequately via urinal. Taking only scheduled Tylenol for pain; well controlled. Pending cultures for PICC/abx plan.

## 2019-12-15 NOTE — PROGRESS NOTES
Picc order noted.  Discussed with unit RN.  Will hold on line placement until ID evaluates type of antibiotics needed and type of line that is needed.

## 2019-12-16 ENCOUNTER — HOME INFUSION (PRE-WILLOW HOME INFUSION) (OUTPATIENT)
Dept: PHARMACY | Facility: CLINIC | Age: 59
End: 2019-12-16

## 2019-12-16 VITALS
SYSTOLIC BLOOD PRESSURE: 138 MMHG | TEMPERATURE: 98.4 F | HEART RATE: 72 BPM | WEIGHT: 199 LBS | RESPIRATION RATE: 16 BRPM | OXYGEN SATURATION: 99 % | BODY MASS INDEX: 31.23 KG/M2 | DIASTOLIC BLOOD PRESSURE: 87 MMHG | HEIGHT: 67 IN

## 2019-12-16 LAB
GLUCOSE BLDC GLUCOMTR-MCNC: 119 MG/DL (ref 70–99)
GLUCOSE BLDC GLUCOMTR-MCNC: 99 MG/DL (ref 70–99)

## 2019-12-16 PROCEDURE — 25000128 H RX IP 250 OP 636: Performed by: INTERNAL MEDICINE

## 2019-12-16 PROCEDURE — 25000132 ZZH RX MED GY IP 250 OP 250 PS 637: Performed by: PHYSICIAN ASSISTANT

## 2019-12-16 PROCEDURE — 25000125 ZZHC RX 250: Performed by: INTERNAL MEDICINE

## 2019-12-16 PROCEDURE — 27210218 ZZH KIT SHRLOCK 4FR SNGL LUM PICC

## 2019-12-16 PROCEDURE — 40000257 ZZH STATISTIC CONSULT NO CHARGE VASC ACCESS

## 2019-12-16 PROCEDURE — 99239 HOSP IP/OBS DSCHRG MGMT >30: CPT | Performed by: INTERNAL MEDICINE

## 2019-12-16 PROCEDURE — 25000132 ZZH RX MED GY IP 250 OP 250 PS 637: Performed by: INTERNAL MEDICINE

## 2019-12-16 PROCEDURE — 00000146 ZZHCL STATISTIC GLUCOSE BY METER IP

## 2019-12-16 PROCEDURE — 36569 INSJ PICC 5 YR+ W/O IMAGING: CPT

## 2019-12-16 RX ORDER — CEFAZOLIN SODIUM 1 G/3ML
2 INJECTION, POWDER, FOR SOLUTION INTRAMUSCULAR; INTRAVENOUS EVERY 8 HOURS
Qty: 1 EACH | DISCHARGE
Start: 2019-12-16 | End: 2019-12-16

## 2019-12-16 RX ORDER — CEFAZOLIN SODIUM 1 G/3ML
2 INJECTION, POWDER, FOR SOLUTION INTRAMUSCULAR; INTRAVENOUS EVERY 8 HOURS
Qty: 1 EACH | Refills: 0 | Status: SHIPPED | OUTPATIENT
Start: 2019-12-16 | End: 2020-01-23

## 2019-12-16 RX ADMIN — ACETAMINOPHEN 650 MG: 325 TABLET, FILM COATED ORAL at 00:58

## 2019-12-16 RX ADMIN — AMLODIPINE BESYLATE 5 MG: 5 TABLET ORAL at 09:13

## 2019-12-16 RX ADMIN — CEFAZOLIN SODIUM 2 G: 2 INJECTION, SOLUTION INTRAVENOUS at 11:47

## 2019-12-16 RX ADMIN — MONTELUKAST 10 MG: 10 TABLET, FILM COATED ORAL at 09:12

## 2019-12-16 RX ADMIN — ACETAMINOPHEN 650 MG: 325 TABLET, FILM COATED ORAL at 09:12

## 2019-12-16 RX ADMIN — ASPIRIN 325 MG: 325 TABLET, DELAYED RELEASE ORAL at 09:13

## 2019-12-16 RX ADMIN — SERTRALINE HYDROCHLORIDE 50 MG: 50 TABLET ORAL at 09:12

## 2019-12-16 RX ADMIN — LIDOCAINE HYDROCHLORIDE 1 ML: 10 INJECTION, SOLUTION INFILTRATION; PERINEURAL at 12:41

## 2019-12-16 RX ADMIN — ATORVASTATIN CALCIUM 20 MG: 20 TABLET, FILM COATED ORAL at 09:12

## 2019-12-16 RX ADMIN — CEFAZOLIN SODIUM 2 G: 2 INJECTION, SOLUTION INTRAVENOUS at 03:38

## 2019-12-16 RX ADMIN — DULOXETINE HYDROCHLORIDE 60 MG: 30 CAPSULE, DELAYED RELEASE ORAL at 09:12

## 2019-12-16 RX ADMIN — FLUTICASONE FUROATE AND VILANTEROL TRIFENATATE 1 PUFF: 200; 25 POWDER RESPIRATORY (INHALATION) at 09:14

## 2019-12-16 ASSESSMENT — ACTIVITIES OF DAILY LIVING (ADL)
ADLS_ACUITY_SCORE: 14

## 2019-12-16 NOTE — PROGRESS NOTES
[facility]    Infectious Disease Progress Note    Date of Service (when I saw the patient): 12/16/2019     Assessment & Plan   Liborio Noguera is a 59 year old male who was admitted on 12/12/2019.     Impression:  1. 59 y.o male with recent history of right total hip arthroplasty done about 4.5 months ago.   2. Presenting with cellulitis with fluctuance over the right hip concerning for right CAMILLA infection.   3. S/P: Irrigation and debridement of right total hip arthroplasty with exchange of the polyethylene liner of the acetabulum and the femoral head followed by abductor repair.   4. Cultures pending.  5. Given ancef around surgery, a day of keflex prior to the admission.      Recommendations:   Blood cultures and OR cultures continue to be negative  Ancef for discharge with picc, I will continue to follow up on the cultures and change antibiotics if as needed.           Barron Pruett MD    Interval History   No positive micro   No fevers     Physical Exam   Temp: 98.4  F (36.9  C) Temp src: Oral BP: 138/87   Heart Rate: 81 Resp: 16 SpO2: 99 % O2 Device: None (Room air)    Vitals:    12/12/19 1659   Weight: 90.3 kg (199 lb)     Vital Signs with Ranges  Temp:  [98.3  F (36.8  C)-99.3  F (37.4  C)] 98.4  F (36.9  C)  Heart Rate:  [64-81] 81  Resp:  [16] 16  BP: (108-138)/(59-87) 138/87  SpO2:  [98 %-99 %] 99 %    Constitutional: Awake, alert, cooperative, no apparent distress  Lungs: Clear to auscultation bilaterally, no crackles or wheezing  Cardiovascular: Regular rate and rhythm, normal S1 and S2, and no murmur noted  Abdomen: Normal bowel sounds, soft, non-distended, non-tender  Skin: No rashes, no cyanosis, no edema  Other:    Medications       amLODIPine  5 mg Oral Daily     aspirin  325 mg Oral Daily     atorvastatin  20 mg Oral Daily     ceFAZolin  2 g Intravenous Q8H     DULoxetine  60 mg Oral Daily     fluticasone-vilanterol  1 puff Inhalation Daily     insulin aspart  1-7 Units  Subcutaneous TID AC     insulin aspart  1-5 Units Subcutaneous At Bedtime     montelukast  10 mg Oral Daily     polyethylene glycol  17 g Oral Daily     senna-docusate  2 tablet Oral BID     sertraline  50 mg Oral Daily     sodium chloride (PF)  3 mL Intracatheter Q8H       Data   All microbiology laboratory data reviewed.  Recent Labs   Lab Test 12/14/19  0703 12/13/19  0503 12/12/19  1541   WBC  --   --  14.2*   HGB 8.2* 8.2* 10.0*   HCT  --   --  31.1*   MCV  --   --  81   PLT  --   --  388     Recent Labs   Lab Test 12/13/19  0503 12/12/19  1541 08/29/19  0705   CR 1.11 1.25 1.01     Recent Labs   Lab Test 12/12/19  1541   SED 57*     Recent Labs   Lab Test 12/12/19  1545 12/12/19  1540 12/12/19  1335   CULT No growth after 4 days No growth after 4 days Culture negative monitoring continues  Culture negative monitoring continues       Attestation:  Total time on the floor involved in the patient's care: 35 minutes. Total time spent in counseling/care coordination: >50%

## 2019-12-16 NOTE — PLAN OF CARE
VSS, on room air. Dressing cdi, numbness on RLE, baseline. Up with assist of 1 and walker. Tylenol for pain. Possible discharge tomorrow

## 2019-12-16 NOTE — PROGRESS NOTES
[facility]    Met with patient/caregiver at bedside and explained administration  of IV abx (Cefazolin Q8, IV Push). Instructed in administration using teaching sheets and practice equipment. Patient/caregiver able to return demonstrate proper technique with administration, flushing IV catheter and verbalized understanding of infection prevention (ie hand hygiene, scrub hub, clean work space, bathing). Patient/ caregiver stated they feel comfortable self administering.  Extension tubing added to IV catheter. Line flushed well without resistance and had brisk blood return.     Delivery of med and supplies will be to patient hospital room between 3-330 today 12/16.  [facility] will provide nursing and call patient to set up first home visit for tomorrow 12/17  .    Patient/caregiver verbalized understanding of dc plans.  Patient/caregiver willing and able to manage IV therapy independently.     Thank you for the opportunity to service Mr Liborio Noguera.    Merry Roman. RN BSN PHN VANESSA  [facility]  [phone] Office  [phone] Fax  [phone] Mobile

## 2019-12-16 NOTE — PLAN OF CARE
PT-  Pt declined PT at this time.  Stated he just ordered breakfast and wants to eat before he does therapy.  Pt also expressed frustration with still being hospitalized and stated he plans to discharge home today.     Pt discharged home prior to therapist returning.  PT goals partially met.

## 2019-12-16 NOTE — PLAN OF CARE
VSS on RA, CMS intact, Dressing CDI and brace on. Denies any SOB or chest pain. Pain controlled with tylenol. Went over discharge instruction and medication with patient and spouse. Discharging home with PICC. Waiting for home antibiotics. All questions answered.

## 2019-12-16 NOTE — PLAN OF CARE
Dressing CDI. VSS on RA. Voiding adequately in urinal. Declines pain meds; PRN Tylenol given x1. Discharge pending abx plan/midline vs PICC placement.

## 2019-12-16 NOTE — PROGRESS NOTES
PICC is placed.  Referral sent to [facility] for benefit check.  Merry, Nurse Liaison from home infusion, here to speak with patient.  Pt has transportation for discharge. MD aware.    Domitila Arredondo RN, BSN  Inpatient Care Coordination  [phone]  [facility]

## 2019-12-16 NOTE — PROGRESS NOTES
Paged Dr. Pruett regarding abx choice for patient discharge.      Spoke with Dr. Flynn GONZALEZ for PICC placement and abx order in.  Updated pt and picc nurse.

## 2019-12-16 NOTE — DISCHARGE SUMMARY
[facility]    Discharge Summary  Hospitalist    Date of Admission:  12/12/2019  Date of Discharge:  12/16/2019  Discharging Provider: Kvng Santana MD    Discharge Diagnoses   Principal Problem:    Infected Right CAMILLA  Active Problems:    Benign essential hypertension    Type 2 diabetes mellitus (H)    Alcohol use disorder, moderate     Anemia of chronic disorder    Acute blood loss anemia    History of Present Illness   59-year-old male with past medical history of non-insulin dependent type 2 diabetes, hypertension, hx of significant alcohol use, and dyslipidemia who was directly admitted from [facility]opedic Clinic for evaluation of infected right total hip arthroplasty.  The patient underwent a right total hip arthroplasty 08/28/2019.  His recovery was relatively uncomplicated and has done well since.  Two days ago he awoke with increasing right hip pain as well as some redness around his incision, seen in [facility] clinic, started on oral antibiotic and instructed to see his orthopedist and did come to the clinic 2 days later, seen by  Dr. Montez who noted it was obviously infected, and admitted for IV antibiotics and washout later the same day.        He notes he has been febrile up to 100.8 with some chills.  He has moderate alcohol use daily at home.  WBC 14.2, hgb 10.0, potassium 3.1, CRP markedly elevated at 241, Hgb A1C 6.0.     Hospital Course   Liborio Noguera was admitted on 12/12/2019.  The following problems were addressed during his hospitalization:    Principal Problem:    Infected Right CAMILLA  Active Problems:    Benign essential hypertension    Type 2 diabetes mellitus (H)    Alcohol use disorder, moderate, dependence (H)    Anemia of chronic disorder    Acute blood loss anemia  Admitted to ortho floor, had joint wash out by Dr. Montez and the deep tissue was obviously infected, treated with IV ancef 2 gm q8, seen by Dr. Flynn Mart who recommended 6 weeks IV  antibiotic therapy.      All blood and tissue cultures showed no growth, but probably related to being on antibiotics for 2 days prior to cultures being obtained.  Clinically doing well at time of discharge, using only tylenol for pain.  He was advised to avoid alcohol.  He was placed on alcohol withdrawal protocol but it was not needed.  He was noted to have soft bones at time of the joint wash out, and improved nutrition would help with healing.  Also started daily Vit D and Tums bid for bone health.   Pt had picc line per ID placed day of discharge. He has 38 days left of IV aBX. He met with iv infusion RN on day of discharge  Left forearm had mild swelling and redness with slight venous cord noted. Much improved in swelling and redness and discomfort at time of discharge. Pt declined US of site and would like to just proceed with warm packs and monitoring since much improved. This seems reasonable. Pt given parameters in which to call on this issue. picc line placed in R UE.  Case discussed with RN, ID, patient and family at discharge. Has ortho follow up. cx ngtd day of discharge    Kvng Santana MD, MD    Significant Results and Procedures   See hospital course    Fluid cx and bld cx ngtd.   Pending Results   These results will be followed up by hospitalist, ID  Unresulted Labs Ordered in the Past 30 Days of this Admission     Date and Time Order Name Status Description    12/12/2019 1452 Blood culture Preliminary     12/12/2019 1452 Blood culture Preliminary     12/12/2019 1335 Hospital - FLUID CULTURE AEROBIC BACTERIAL Preliminary     12/12/2019 1335 Hospital - ANAEROBIC BACTERIAL CULTURE Preliminary           Code Status   Full Code       Primary Care Physician   FABY AUSTIN    Physical Exam   Temp: 98.4  F (36.9  C) Temp src: Oral BP: 138/87   Heart Rate: 81 Resp: 16 SpO2: 99 % O2 Device: None (Room air)    Vitals:    12/12/19 1659   Weight: 90.3 kg (199 lb)     Vital Signs with Ranges  Temp:   [98.3  F (36.8  C)-99.3  F (37.4  C)] 98.4  F (36.9  C)  Heart Rate:  [64-81] 81  Resp:  [16] 16  BP: (108-138)/(59-87) 138/87  SpO2:  [98 %-99 %] 99 %  I/O last 3 completed shifts:  In: -   Out: 1500 [Urine:1500]    Constitutional: laying bed, nad  Respiratory: breathing easily, CTAB  Cardiovascular: RRR no r/g/m  GI: soft, nt, nd  Skin: left forearm with very slight swelling and minor redness dorsum of arm. Mild discomfort with palpation, mild venous cord forearm- much improved per patient  Neurologic: nl speech  And mentation  Neuropsychiatric: nl affect,     Discharge Disposition   Discharged to home  Condition at discharge: Good    Consultations This Hospital Stay   HOSPITALIST IP CONSULT  OCCUPATIONAL THERAPY ADULT IP CONSULT  PHYSICAL THERAPY ADULT IP CONSULT  INFECTIOUS DISEASES IP CONSULT  ORTHOSIS EXTREMITY LOWER REFERRAL IP CONSULT  INFECTIOUS DISEASES IP CONSULT  VASCULAR ACCESS ADULT IP CONSULT    Time Spent on this Encounter   I, Kvng Santana MD, personally saw the patient today and spent less than or equal to 30 minutes discharging this patient.    Discharge Orders      Compression stockings     Home care nursing referral      Home infusion referral      Reason for your hospital stay    Patient admitted status post I&D right total hip replacement     Activity    Your activity upon discharge: activity as tolerated     MD face to face encounter    Documentation of Face to Face and Certification for Home Health Services    I certify that patient: Liborio Noguera is under my care and that I, or a nurse practitioner or physician's assistant working with me, had a face-to-face encounter that meets the physician face-to-face encounter requirements with this patient on: 12/15/2019.    This encounter with the patient was in whole, or in part, for the following medical condition, which is the primary reason for home health care: infected right CAMILLA.    I certify that, based on my findings, the  following services are medically necessary home health services: Nursing.    My clinical findings support the need for the above services because: Nurse is needed: For complex aftercare of surgical procedures because the patient needs instruction and cannot perform care on their own due to: infected hip and need for IV antibiotics..    Further, I certify that my clinical findings support that this patient is homebound (i.e. absences from home require considerable and taxing effort and are for medical reasons or [employer] services or infrequently or of short duration when for other reasons) because: Leaving home is medically contraindicated for the following reason(s): Other physician ordered restriction: pain with ambulation related to infected hip...    Based on the above findings. I certify that this patient is confined to the home and needs intermittent skilled nursing care, physical therapy and/or speech therapy.  The patient is under my care, and I have initiated the establishment of the plan of care.  This patient will be followed by a physician who will periodically review the plan of care.  Physician/Provider to provide follow up care: Obed Medley    Attending hospital physician (the Medicare certified [place] provider): Abdirizak Aponte MD  Physician Signature: See electronic signature associated with these discharge orders.  Date: 12/15/2019     Follow-up and recommended labs and tests     Follow up with Dr. Montez in 2 weeks, and weekly CBC, BMP and CRP to be drawn by infusion nurse with results faxed to Dr. Flynn Mart, and follow-up with Dr. Mart in 3-4 weeks.     Discharge Instructions    Patient is to slowly progress back into their activities over the next several weeks.   They are to keep the Prineo(mesh) dressing in place at all times and do not remove it.   The island dressing can be removed. You can shower but try and keep the incision site covered and do not get it wet. Do not at any  point in time, submerge the incision or soak the incision.   Monitor the wound closely for any foul smelling or abnormal discharge. Any temperatures over 101.5 with chills, or any redness about the incision please contact our office immediately. If you experience any chest pains, or shortness of breath, go directly to the emergency department.   Follow up with Dr. Montez in approximately 2 weeks and call if you have any complaints between now and your follow up.   Call [phone] to schedule your follow up if you do not already have one scheduled.   Maintain hip precautions with no hip abduction until we clear you to do so.   Make sure that you have someone with you when you get up and move around the house and make sure you use some form of ambulatory assistance at all times, preferably a walker.   Take aspirin for prevention of blood clots  Take oxycodone for pain control  Take senokot to help with constipation  Take atarax to help with spasms/itching/relaxation  Take zofran to help with nausea  Antibiotic selection IV per infectious disease team.   Hip abduction brace at all times for 8 weeks.   Keep picc line site clean and dry at all times.   Follow up with ID team as they instruct  MICKY stockings for three weeks post op.  Warm compress to left forearm 3-4 times daily to help with inflammation at old IV site. Seek medical attention immediately if notice increase swelling, redness or pain in left arm     Diet    Follow this diet upon discharge: Orders Placed This Encounter      Advance Diet as Tolerated: Regular Diet Adult.  Avoid alcohol.     Discharge Medications   Discharge Medication List as of 12/16/2019  2:58 PM      START taking these medications    Details   aspirin (ASA) 325 MG EC tablet Take 1 tablet (325 mg) by mouth daily, Disp-30 tablet, R-0, E-Prescribe      calcium carbonate (TUMS) 500 MG chewable tablet Take 1 tablet (500 mg) by mouth 2 times daily, No Print OutFor healthy bones      heparin  lock flush 10 UNIT/ML SOLN injection 2-5 mLs by Intracatheter route once as needed for line flush (for locking each dormant lumen with line placement), R-0, No Print Out      hydrOXYzine (ATARAX) 25 MG tablet Take 1 tablet (25 mg) by mouth every 6 hours as needed for other (adjuvant pain), Disp-30 tablet, R-0, E-Prescribe      ondansetron (ZOFRAN-ODT) 4 MG ODT tab Take 1 tablet (4 mg) by mouth every 6 hours as needed for nausea or vomiting, Disp-30 tablet, R-0, E-Prescribe      oxyCODONE (ROXICODONE) 5 MG tablet Take 1-2 tablets (5-10 mg) by mouth every 3 hours as needed for breakthrough pain, Disp-30 tablet, R-0, Local Print      senna-docusate (SENOKOT-S/PERICOLACE) 8.6-50 MG tablet Take 2 tablets by mouth 2 times daily, Disp-30 tablet, R-0, E-Prescribe      sodium chloride, PF, 0.9% PF flush 5-50 mLs by Intracatheter route once as needed for line flush (to flush each lumen with line placement), No Print Out      vitamin D3 (CHOLECALCIFEROL) 2000 units (50 mcg) tablet Take 1 tablet (2,000 Units) by mouth daily, No Print OutFor healthy bones         CONTINUE these medications which have CHANGED    Details   ceFAZolin (ANCEF) 1 GM vial Inject 2 g into the vein every 8 hours CBC with differential, creatinine, SGOT weekly while on this medication to be faxed to Dr. Mayo office., Disp-1 each, R-0, Local Print         CONTINUE these medications which have NOT CHANGED    Details   acetaminophen (TYLENOL) 500 MG tablet Take 1,000 mg by mouth 2 times daily as needed for mild pain , Historical      albuterol (VENTOLIN HFA) 108 (90 Base) MCG/ACT inhaler Inhale 2 puffs into the lungs every 6 hours as needed , Historical      amLODIPine (NORVASC) 5 MG tablet Take 5 mg by mouth daily , Historical      atorvastatin (LIPITOR) 20 MG tablet Take 20 mg by mouth daily, Historical      diphenhydrAMINE-acetaminophen (TYLENOL PM)  MG tablet Take 2 tablets by mouth nightly as needed for sleep, Historical      DULoxetine  (CYMBALTA) 60 MG capsule Take 60 mg by mouth daily , R-3, Historical      fluticasone (FLONASE) 50 MCG/ACT nasal spray Spray 1 spray in nostril daily as needed , Historical      fluticasone-salmeterol (ADVAIR DISKUS) 250-50 MCG/DOSE inhaler Inhale 1 puff into the lungs every morning Prescribed BID; pt takes daily., Historical      losartan-hydrochlorothiazide (HYZAAR) 100-25 MG tablet Take 1 tablet by mouth daily , R-3, Historical      metFORMIN (GLUCOPHAGE) 500 MG tablet Take 500 mg by mouth daily , Historical      montelukast (SINGULAIR) 10 MG tablet Take 10 mg by mouth daily , Historical      sertraline (ZOLOFT) 50 MG tablet Take 50 mg by mouth daily , Historical         STOP taking these medications       ceFAZolin (ANCEF) intermittent infusion 2 g in 100 mL dextrose PRE-MIX Comments:   Reason for Stopping:         cephALEXin (KEFLEX) 500 MG capsule Comments:   Reason for Stopping:             Allergies   Allergies   Allergen Reactions     Ace Inhibitors Cough     Data   Most Recent 3 CBC's:  Recent Labs   Lab Test 12/14/19  0703 12/13/19  0503 12/12/19  1541   WBC  --   --  14.2*   HGB 8.2* 8.2* 10.0*   MCV  --   --  81   PLT  --   --  388      Most Recent 3 BMP's:  Recent Labs   Lab Test 12/14/19  0703 12/13/19  0503 12/12/19  1541 08/29/19  0705   NA  --  135 132* 138   POTASSIUM  --  3.5  Canceled, Test credited 3.1* 3.6   CHLORIDE  --  105 104 105   CO2  --  25 24 27   BUN  --  21 27 18   CR  --  1.11 1.25 1.01   ANIONGAP  --  5 4 6   DOLORES  --  8.1* 8.8 8.3*   * 112* 100* 125*     Most Recent 2 LFT's:No lab results found.  Most Recent INR's and Anticoagulation Dosing History:  Anticoagulation Dose History     There is no flowsheet data to display.        Most Recent 3 Troponin's:No lab results found.  Most Recent Cholesterol Panel:  Recent Labs   Lab Test 04/03/19   CHOL 183   LDL 65   *   TRIG 54     Most Recent 6 Bacteria Isolates From Any Culture (See EPIC Reports for Culture  Details):  Recent Labs   Lab Test 12/12/19  1545 12/12/19  1540 12/12/19  1335   CULT No growth after 4 days No growth after 4 days Culture negative monitoring continues  Culture negative monitoring continues     Most Recent TSH, T4 and A1c Labs:  Recent Labs   Lab Test 12/12/19  1541   A1C 6.0*     Results for orders placed or performed during the hospital encounter of 08/28/19   XR Pelvis Port 1/2 Views    Narrative    PELVIS PORTABLE 1/2 VIEW(S) 8/28/2019 12:10 PM     HISTORY: Right total hip, intraoperative imaging, OR 32, OR portable,  [id number], AAG.    COMPARISON: None.      Impression    IMPRESSION: AP intraoperative view shows adequate placement of the  acetabular cup portion of a new right hip arthroplasty. A femoral stem  sizing device is seen in place. No acute bony abnormality.    RUCHI NIELSEN MD

## 2019-12-17 ENCOUNTER — HOME INFUSION (PRE-WILLOW HOME INFUSION) (OUTPATIENT)
Dept: PHARMACY | Facility: CLINIC | Age: 59
End: 2019-12-17

## 2019-12-17 LAB
BACTERIA SPEC CULT: NO GROWTH
SPECIMEN SOURCE: NORMAL

## 2019-12-17 NOTE — PROGRESS NOTES
This is a recent snapshot of the patient's [facility] medical record.  For current drug dose and complete information and questions, call [phone] or In [facility] pool, fv home infusion ([id number])  CSN Number:  [id number]

## 2019-12-18 ENCOUNTER — HOME INFUSION (PRE-WILLOW HOME INFUSION) (OUTPATIENT)
Dept: PHARMACY | Facility: CLINIC | Age: 59
End: 2019-12-18

## 2019-12-18 LAB
BACTERIA SPEC CULT: NO GROWTH
BACTERIA SPEC CULT: NO GROWTH
Lab: NORMAL
Lab: NORMAL
SPECIMEN SOURCE: NORMAL
SPECIMEN SOURCE: NORMAL

## 2019-12-18 NOTE — PROGRESS NOTES
This is a recent snapshot of the patient's [facility] medical record.  For current drug dose and complete information and questions, call [phone] or In [facility] pool, fv home infusion ([id number])  CSN Number:  [id number]

## 2019-12-19 ENCOUNTER — HOME INFUSION (PRE-WILLOW HOME INFUSION) (OUTPATIENT)
Dept: PHARMACY | Facility: CLINIC | Age: 59
End: 2019-12-19

## 2019-12-19 NOTE — PROGRESS NOTES
This is a recent snapshot of the patient's [facility] medical record.  For current drug dose and complete information and questions, call [phone] or In [facility] pool, fv home infusion ([id number])  CSN Number:  [id number]

## 2019-12-20 NOTE — PROGRESS NOTES
This is a recent snapshot of the patient's [facility] medical record.  For current drug dose and complete information and questions, call [phone] or In [facility] pool, fv home infusion ([id number])  CSN Number:  [id number]

## 2019-12-23 ENCOUNTER — HOME INFUSION (PRE-WILLOW HOME INFUSION) (OUTPATIENT)
Dept: PHARMACY | Facility: CLINIC | Age: 59
End: 2019-12-23

## 2019-12-24 ENCOUNTER — HOME INFUSION (PRE-WILLOW HOME INFUSION) (OUTPATIENT)
Dept: PHARMACY | Facility: CLINIC | Age: 59
End: 2019-12-24

## 2019-12-24 NOTE — PROGRESS NOTES
This is a recent snapshot of the patient's [facility] medical record.  For current drug dose and complete information and questions, call [phone] or In [facility] pool, fv home infusion ([id number])  CSN Number:  [id number]

## 2019-12-26 LAB
BACTERIA SPEC CULT: NORMAL
Lab: NORMAL
SPECIMEN SOURCE: NORMAL

## 2019-12-26 NOTE — PROGRESS NOTES
This is a recent snapshot of the patient's [facility] medical record.  For current drug dose and complete information and questions, call [phone] or In [facility] pool, fv home infusion ([id number])  CSN Number:  [id number]

## 2019-12-27 ENCOUNTER — HOME INFUSION (PRE-WILLOW HOME INFUSION) (OUTPATIENT)
Dept: PHARMACY | Facility: CLINIC | Age: 59
End: 2019-12-27

## 2019-12-28 ENCOUNTER — HOME INFUSION (PRE-WILLOW HOME INFUSION) (OUTPATIENT)
Dept: PHARMACY | Facility: CLINIC | Age: 59
End: 2019-12-28

## 2019-12-30 ENCOUNTER — HOME INFUSION (PRE-WILLOW HOME INFUSION) (OUTPATIENT)
Dept: PHARMACY | Facility: CLINIC | Age: 59
End: 2019-12-30

## 2019-12-31 ENCOUNTER — HOME INFUSION (PRE-WILLOW HOME INFUSION) (OUTPATIENT)
Dept: PHARMACY | Facility: CLINIC | Age: 59
End: 2019-12-31

## 2020-01-02 ENCOUNTER — HOME INFUSION (PRE-WILLOW HOME INFUSION) (OUTPATIENT)
Dept: PHARMACY | Facility: CLINIC | Age: 60
End: 2020-01-02

## 2020-01-02 NOTE — PROGRESS NOTES
This is a recent snapshot of the patient's [facility] medical record.  For current drug dose and complete information and questions, call [phone] or In [facility] pool, fv home infusion ([id number])  CSN Number:  [id number]

## 2020-01-02 NOTE — PROGRESS NOTES
This is a recent snapshot of the patient's [facility] medical record.  For current drug dose and complete information and questions, call [phone] or In [facility] pool, fv home infusion ([id number])  CSN Number:  [id number]

## 2020-01-03 NOTE — PROGRESS NOTES
This is a recent snapshot of the patient's [facility] medical record.  For current drug dose and complete information and questions, call [phone] or In [facility] pool, fv home infusion ([id number])  CSN Number:  [id number]

## 2020-01-03 NOTE — PROGRESS NOTES
This is a recent snapshot of the patient's [facility] medical record.  For current drug dose and complete information and questions, call [phone] or In [facility] pool, fv home infusion ([id number])  CSN Number:  [id number]

## 2020-01-03 NOTE — PROGRESS NOTES
This is a recent snapshot of the patient's [facility] medical record.  For current drug dose and complete information and questions, call [phone] or In [facility] pool, fv home infusion ([id number])  CSN Number:  [id number]

## 2020-01-06 ENCOUNTER — HOME INFUSION (PRE-WILLOW HOME INFUSION) (OUTPATIENT)
Dept: PHARMACY | Facility: CLINIC | Age: 60
End: 2020-01-06

## 2020-01-07 ENCOUNTER — HOME INFUSION (PRE-WILLOW HOME INFUSION) (OUTPATIENT)
Dept: PHARMACY | Facility: CLINIC | Age: 60
End: 2020-01-07

## 2020-01-07 NOTE — PROGRESS NOTES
This is a recent snapshot of the patient's [facility] medical record.  For current drug dose and complete information and questions, call [phone] or In [facility] pool, fv home infusion ([phone])  CSN Number:  [id number]

## 2020-01-08 ENCOUNTER — HOME INFUSION (PRE-WILLOW HOME INFUSION) (OUTPATIENT)
Dept: PHARMACY | Facility: CLINIC | Age: 60
End: 2020-01-08

## 2020-01-08 NOTE — PROGRESS NOTES
This is a recent snapshot of the patient's [facility] medical record.  For current drug dose and complete information and questions, call [phone] or In [facility] pool, fv home infusion ([id number])  CSN Number:  [id number]

## 2020-01-09 NOTE — PROGRESS NOTES
This is a recent snapshot of the patient's [facility] medical record.  For current drug dose and complete information and questions, call [phone] or In [facility] pool, fv home infusion ([id number])  CSN Number:  [id number]

## 2020-01-14 ENCOUNTER — HOME INFUSION (PRE-WILLOW HOME INFUSION) (OUTPATIENT)
Dept: PHARMACY | Facility: CLINIC | Age: 60
End: 2020-01-14

## 2020-01-15 ENCOUNTER — HOME INFUSION (PRE-WILLOW HOME INFUSION) (OUTPATIENT)
Dept: PHARMACY | Facility: CLINIC | Age: 60
End: 2020-01-15

## 2020-01-15 NOTE — PROGRESS NOTES
This is a recent snapshot of the patient's [facility] medical record.  For current drug dose and complete information and questions, call [phone] or In [facility] pool, fv home infusion ([id number])  CSN Number:  [id number]

## 2020-01-16 NOTE — PROGRESS NOTES
This is a recent snapshot of the patient's [facility] medical record.  For current drug dose and complete information and questions, call [phone] or In [facility] pool, fv home infusion ([id number])  CSN Number:  [id number]

## 2020-01-20 ENCOUNTER — HOME INFUSION (PRE-WILLOW HOME INFUSION) (OUTPATIENT)
Dept: PHARMACY | Facility: CLINIC | Age: 60
End: 2020-01-20

## 2020-01-21 ENCOUNTER — HOME INFUSION (PRE-WILLOW HOME INFUSION) (OUTPATIENT)
Dept: PHARMACY | Facility: CLINIC | Age: 60
End: 2020-01-21

## 2020-01-21 NOTE — PROGRESS NOTES
This is a recent snapshot of the patient's [facility] medical record.  For current drug dose and complete information and questions, call [phone] or In Basket pool, fv home infusion ([id number])  CSN Number:  [id number]

## 2020-01-22 ENCOUNTER — HOME INFUSION (PRE-WILLOW HOME INFUSION) (OUTPATIENT)
Dept: PHARMACY | Facility: CLINIC | Age: 60
End: 2020-01-22

## 2020-01-22 NOTE — PROGRESS NOTES
This is a recent snapshot of the patient's [facility] medical record.  For current drug dose and complete information and questions, call [phone] or In Basket pool, fv home infusion ([id number])  CSN Number:  [id number]

## 2020-01-23 NOTE — PROGRESS NOTES
This is a recent snapshot of the patient's [facility] medical record.  For current drug dose and complete information and questions, call [phone] or In Basket pool, fv home infusion ([id number])  CSN Number:  [id number]

## 2020-01-28 ENCOUNTER — TELEPHONE (OUTPATIENT)
Dept: PHARMACY | Facility: CLINIC | Age: 60
End: 2020-01-28

## 2020-01-28 NOTE — TELEPHONE ENCOUNTER
PA Initiation    Medication: Cathflo  Insurance Company: [facility] - Phone [phone] Fax [phone]  Pharmacy Filling the Rx: [facility]  Filling Pharmacy Phone: [phone]  Filling Pharmacy Fax:    Start Date: 1/28/2020    [place] Prior Authorization Team   Phone: [phone]      Manually faxed prior authorization form to [facility] at fax# [phone]

## 2020-01-28 NOTE — TELEPHONE ENCOUNTER
[facility]     Drug: Cathflo 2mg prn  J Code:  NDC: [address]-87  ICD 10 code: T84.59XA, Z96.641    Date(s) of Service: 12/31/19-12/31/20      Provider: ARLINE MALLORY  Provider NPI: [id number]      [facility]  NPI: [id number]

## 2020-01-29 ENCOUNTER — HOME INFUSION (PRE-WILLOW HOME INFUSION) (OUTPATIENT)
Dept: PHARMACY | Facility: CLINIC | Age: 60
End: 2020-01-29

## 2020-01-29 NOTE — TELEPHONE ENCOUNTER
Prior Authorization Approval    Authorization Effective Date: 12/29/2019  Authorization Expiration Date: 1/28/2021  Medication: Cathflo  Approved Dose/Quantity: 1  Reference #: [id number]   Insurance Company: [employer] - Phone [phone] Fax [phone]  Which Pharmacy is filling the prescription (Not needed for infusion/clinic administered): [facility]  Pharmacy Notified: Yes

## 2020-01-30 NOTE — PROGRESS NOTES
This is a recent snapshot of the patient's [facility] medical record.  For current drug dose and complete information and questions, call [phone] or In Basket pool, fv home infusion ([id number])  CSN Number:  [id number]

## 2023-08-29 NOTE — PLAN OF CARE
Pt a&o x4, VSS on RA, controlling pain with tylenol and toradol, up with 1, voiding adequately in urinal, regular diet - blood sugar checks, CMS intact, dressing CDI, hip brace in place.    Cyclophosphamide Counseling:  I discussed with the patient the risks of cyclophosphamide including but not limited to hair loss, hormonal abnormalities, decreased fertility, abdominal pain, diarrhea, nausea and vomiting, bone marrow suppression and infection. The patient understands that monitoring is required while taking this medication.

## (undated) DEVICE — BONE CEMENT MIXEVAC HI VAC W/CARTRIDGE 0306-563-000

## (undated) DEVICE — IMM PILLOW ABDUCT HIP MED 31143061

## (undated) DEVICE — SU VICRYL 0 CT-1 27" J340H

## (undated) DEVICE — LIGHT FLEX 3034A

## (undated) DEVICE — DRSG KERLIX FLUFFS X5

## (undated) DEVICE — DRSG XEROFORM 1X8"

## (undated) DEVICE — GLOVE ESTEEM POWDER FREE 8.5  2D72PL85

## (undated) DEVICE — PAD FOAM MCGUIRE KIT 0814-0150

## (undated) DEVICE — SU MONOCRYL 3-0 PS-2 27" Y427H

## (undated) DEVICE — GLOVE ESTEEM BLUE W/NEU-THERA 8.5  2D73PB85

## (undated) DEVICE — PREP CHLORAPREP 26ML TINTED ORANGE  260815

## (undated) DEVICE — GLOVE PROTEXIS BLUE W/NEU-THERA 8.0  2D73EB80

## (undated) DEVICE — SU VICRYL 0 CT-2 CR 8X18" J727D

## (undated) DEVICE — GLOVE PROTEXIS W/NEU-THERA 7.5  2D73TE75

## (undated) DEVICE — SU VICRYL 2-0 CT-1 27" J339H

## (undated) DEVICE — PACK TOTAL HIP W/POUCH SOP15HPFSM

## (undated) DEVICE — ESU GROUND PAD UNIVERSAL W/O CORD

## (undated) DEVICE — DRAPE IOBAN INCISE 23X17" 6650EZ

## (undated) DEVICE — PREP SKIN SCRUB TRAY 4461A

## (undated) DEVICE — LINEN TOWEL PACK X5 5464

## (undated) DEVICE — CATH TRAY FOLEY COUDE SURESTEP 16FR W/URNE MTR STLK A304716A

## (undated) DEVICE — DRAPE C-ARM

## (undated) DEVICE — BUR MATCHSTICK 3.0MM FLUTED 15CM ANSPACH MA15-8NS

## (undated) DEVICE — DRAPE MAYO STAND 23X54 8337

## (undated) DEVICE — MANIFOLD NEPTUNE 4 PORT 700-20

## (undated) DEVICE — SOL WATER IRRIG 1000ML BOTTLE 07139-09

## (undated) DEVICE — SU ETHIBOND 2 V-37 4X30" MX69G

## (undated) DEVICE — GLOVE ESTEEM BLUE W/NEU-THERA 8.0  2D73PB80

## (undated) DEVICE — SU STRATAFIX PDS PLUS 0 CT 45CM SXPP1A406

## (undated) DEVICE — Device

## (undated) DEVICE — NDL 18GA 1.5" 305196

## (undated) DEVICE — CLOSURE SYS SKIN PREMIERPRO EXOFIN FUSION 4X22CM STRL 3472

## (undated) DEVICE — SOLUTION WOUND CLEANSING 3/4OZ 10% PVP EA-L3011FB-50

## (undated) DEVICE — GOWN IMPERVIOUS SPECIALTY XLG/XLONG 32474

## (undated) DEVICE — GOWN XXL 9575

## (undated) DEVICE — BONE WAX 2.5GM W31G

## (undated) DEVICE — SOL NACL 0.9% IRRIG 1000ML BOTTLE 2F7124

## (undated) DEVICE — SOL WATER IRRIG 1000ML BOTTLE 2F7114

## (undated) DEVICE — SOL NACL 0.9% INJ 1000ML BAG 2B1324X

## (undated) DEVICE — PIN STEINMAN 1/8"

## (undated) DEVICE — GLOVE ESTEEM POWDER FREE 7.5  2D72PL75

## (undated) DEVICE — SPONGE LAP 18X18" X8435

## (undated) DEVICE — SUCTION IRR SYSTEM W/O TIP INTERPULSE HANDPIECE 0210-100-000

## (undated) DEVICE — DEVICE RETRIEVER HEWSON 71111579

## (undated) DEVICE — SU VICRYL 2-0 CT-2 CR 8X18" J726D

## (undated) DEVICE — GLOVE ESTEEM POWDER FREE 8.0  2D72PL80

## (undated) DEVICE — ESU PENCIL W/SMOKE EVAC NEPTUNE STRYKER 0703-046-000

## (undated) DEVICE — SU ETHIBOND 0 CTX CR  8X18" CX31D

## (undated) DEVICE — INTRODUCER CATH TAUT 2.0MMX1.6MMX7.6CM PI-63

## (undated) DEVICE — SU MONOCRYL 4-0 PS-2 18" UND Y496G

## (undated) DEVICE — SU DERMABOND PRINEO 22CM CLR222US

## (undated) DEVICE — ADH FLOSEAL W/HUMAN THROMBIN 5ML 1501825

## (undated) DEVICE — BLADE SAW SAGITTAL STRK 25X79.5X1.24MM 4/2000 2108-318-000

## (undated) DEVICE — BLADE KNIFE BEAVER 60DEG BEAVER6600

## (undated) DEVICE — SYR 03ML LL W/O NDL

## (undated) DEVICE — GLOVE PROTEXIS W/NEU-THERA 8.0  2D73TE80

## (undated) DEVICE — SYR 30ML LL W/O NDL 302832

## (undated) DEVICE — NDL ECLIPSE 18GA 1.5"

## (undated) DEVICE — ESU ELEC BLADE 2.75" COATED/INSULATED E1455

## (undated) DEVICE — KIT SURGICAL TURNOVER FVSD-01D

## (undated) DEVICE — GLOVE PROTEXIS MICRO 8.0  2D73PM80

## (undated) DEVICE — DRAIN ROUND W/RESERV KIT JACKSON PRATT 10FR 400ML SU130-402D

## (undated) DEVICE — STOCKING SLEEVE COMPRESSION CALF MED

## (undated) DEVICE — TUBING SUCTION 12"X1/4" N612

## (undated) DEVICE — BONE CLEANING TIP INTERPULSE FEMORAL CANAL 0210-008-000

## (undated) DEVICE — ADH LIQUID MASTISOL TOPICAL VIAL 2-3ML 0523-48

## (undated) DEVICE — NDL SPINAL 18GA 3.5" 405184

## (undated) DEVICE — DRAPE MICRO ZEISS OPMI 137X381CM 306070-0000-000

## (undated) DEVICE — SYR BULB IRRIG DOVER 60 ML LATEX FREE 67000

## (undated) RX ORDER — PROPOFOL 10 MG/ML
INJECTION, EMULSION INTRAVENOUS
Status: DISPENSED
Start: 2019-05-15

## (undated) RX ORDER — HYDROMORPHONE HYDROCHLORIDE 1 MG/ML
INJECTION, SOLUTION INTRAMUSCULAR; INTRAVENOUS; SUBCUTANEOUS
Status: DISPENSED
Start: 2019-08-28

## (undated) RX ORDER — ACYCLOVIR 200 MG/1
CAPSULE ORAL
Status: DISPENSED
Start: 2019-08-28

## (undated) RX ORDER — FENTANYL CITRATE 50 UG/ML
INJECTION, SOLUTION INTRAMUSCULAR; INTRAVENOUS
Status: DISPENSED
Start: 2019-12-12

## (undated) RX ORDER — GLYCOPYRROLATE 0.2 MG/ML
INJECTION INTRAMUSCULAR; INTRAVENOUS
Status: DISPENSED
Start: 2019-05-15

## (undated) RX ORDER — KETOROLAC TROMETHAMINE 30 MG/ML
INJECTION, SOLUTION INTRAMUSCULAR; INTRAVENOUS
Status: DISPENSED
Start: 2019-08-28

## (undated) RX ORDER — CEFAZOLIN SODIUM 2 G/100ML
INJECTION, SOLUTION INTRAVENOUS
Status: DISPENSED
Start: 2019-08-28

## (undated) RX ORDER — ONDANSETRON 2 MG/ML
INJECTION INTRAMUSCULAR; INTRAVENOUS
Status: DISPENSED
Start: 2019-05-15

## (undated) RX ORDER — METHYLPREDNISOLONE ACETATE 40 MG/ML
INJECTION, SUSPENSION INTRA-ARTICULAR; INTRALESIONAL; INTRAMUSCULAR; SOFT TISSUE
Status: DISPENSED
Start: 2019-05-15

## (undated) RX ORDER — LIDOCAINE HYDROCHLORIDE 20 MG/ML
INJECTION, SOLUTION EPIDURAL; INFILTRATION; INTRACAUDAL; PERINEURAL
Status: DISPENSED
Start: 2019-05-15

## (undated) RX ORDER — ONDANSETRON 2 MG/ML
INJECTION INTRAMUSCULAR; INTRAVENOUS
Status: DISPENSED
Start: 2019-12-12

## (undated) RX ORDER — HYDROMORPHONE HYDROCHLORIDE 1 MG/ML
INJECTION, SOLUTION INTRAMUSCULAR; INTRAVENOUS; SUBCUTANEOUS
Status: DISPENSED
Start: 2019-12-12

## (undated) RX ORDER — ALBUMIN, HUMAN INJ 5% 5 %
SOLUTION INTRAVENOUS
Status: DISPENSED
Start: 2019-12-12

## (undated) RX ORDER — ROPIVACAINE HYDROCHLORIDE 2 MG/ML
INJECTION, SOLUTION EPIDURAL; INFILTRATION; PERINEURAL
Status: DISPENSED
Start: 2019-08-28

## (undated) RX ORDER — GLYCOPYRROLATE 0.2 MG/ML
INJECTION, SOLUTION INTRAMUSCULAR; INTRAVENOUS
Status: DISPENSED
Start: 2019-12-12

## (undated) RX ORDER — ROPIVACAINE HYDROCHLORIDE 2 MG/ML
INJECTION, SOLUTION EPIDURAL; INFILTRATION; PERINEURAL
Status: DISPENSED
Start: 2019-12-12

## (undated) RX ORDER — CEFAZOLIN SODIUM 1 G/3ML
INJECTION, POWDER, FOR SOLUTION INTRAMUSCULAR; INTRAVENOUS
Status: DISPENSED
Start: 2019-05-15

## (undated) RX ORDER — FENTANYL CITRATE 50 UG/ML
INJECTION, SOLUTION INTRAMUSCULAR; INTRAVENOUS
Status: DISPENSED
Start: 2019-08-28

## (undated) RX ORDER — DEXAMETHASONE SODIUM PHOSPHATE 10 MG/ML
INJECTION, SOLUTION INTRAMUSCULAR; INTRAVENOUS
Status: DISPENSED
Start: 2019-05-15

## (undated) RX ORDER — ALBUMIN, HUMAN INJ 5% 5 %
SOLUTION INTRAVENOUS
Status: DISPENSED
Start: 2019-08-28

## (undated) RX ORDER — GINSENG 100 MG
CAPSULE ORAL
Status: DISPENSED
Start: 2019-12-12

## (undated) RX ORDER — CEFAZOLIN SODIUM 2 G/100ML
INJECTION, SOLUTION INTRAVENOUS
Status: DISPENSED
Start: 2019-12-12

## (undated) RX ORDER — NEOSTIGMINE METHYLSULFATE 1 MG/ML
VIAL (ML) INJECTION
Status: DISPENSED
Start: 2019-12-12

## (undated) RX ORDER — CEFAZOLIN SODIUM 1 G/3ML
INJECTION, POWDER, FOR SOLUTION INTRAMUSCULAR; INTRAVENOUS
Status: DISPENSED
Start: 2019-08-28

## (undated) RX ORDER — ACETAMINOPHEN 500 MG
TABLET ORAL
Status: DISPENSED
Start: 2019-12-12

## (undated) RX ORDER — PROPOFOL 10 MG/ML
INJECTION, EMULSION INTRAVENOUS
Status: DISPENSED
Start: 2019-08-28

## (undated) RX ORDER — LIDOCAINE HYDROCHLORIDE 20 MG/ML
INJECTION, SOLUTION EPIDURAL; INFILTRATION; INTRACAUDAL; PERINEURAL
Status: DISPENSED
Start: 2019-12-12

## (undated) RX ORDER — PROPOFOL 10 MG/ML
INJECTION, EMULSION INTRAVENOUS
Status: DISPENSED
Start: 2019-12-12

## (undated) RX ORDER — TOBRAMYCIN 1.2 G/30ML
INJECTION, POWDER, LYOPHILIZED, FOR SOLUTION INTRAVENOUS
Status: DISPENSED
Start: 2019-12-12

## (undated) RX ORDER — ACYCLOVIR 200 MG/1
CAPSULE ORAL
Status: DISPENSED
Start: 2019-12-12

## (undated) RX ORDER — FENTANYL CITRATE 50 UG/ML
INJECTION, SOLUTION INTRAMUSCULAR; INTRAVENOUS
Status: DISPENSED
Start: 2019-05-15

## (undated) RX ORDER — LIDOCAINE HYDROCHLORIDE AND EPINEPHRINE 10; 10 MG/ML; UG/ML
INJECTION, SOLUTION INFILTRATION; PERINEURAL
Status: DISPENSED
Start: 2019-05-15

## (undated) RX ORDER — VANCOMYCIN HYDROCHLORIDE 1 G/20ML
INJECTION, POWDER, LYOPHILIZED, FOR SOLUTION INTRAVENOUS
Status: DISPENSED
Start: 2019-12-12

## (undated) RX ORDER — KETOROLAC TROMETHAMINE 30 MG/ML
INJECTION, SOLUTION INTRAMUSCULAR; INTRAVENOUS
Status: DISPENSED
Start: 2019-12-12